# Patient Record
Sex: MALE | Race: WHITE | NOT HISPANIC OR LATINO
[De-identification: names, ages, dates, MRNs, and addresses within clinical notes are randomized per-mention and may not be internally consistent; named-entity substitution may affect disease eponyms.]

---

## 2018-03-29 ENCOUNTER — APPOINTMENT (OUTPATIENT)
Dept: SURGERY | Facility: CLINIC | Age: 63
End: 2018-03-29
Payer: COMMERCIAL

## 2018-03-29 VITALS
HEART RATE: 73 BPM | RESPIRATION RATE: 15 BRPM | BODY MASS INDEX: 26.56 KG/M2 | DIASTOLIC BLOOD PRESSURE: 83 MMHG | SYSTOLIC BLOOD PRESSURE: 136 MMHG | OXYGEN SATURATION: 100 % | TEMPERATURE: 98 F | WEIGHT: 207 LBS | HEIGHT: 74 IN

## 2018-03-29 DIAGNOSIS — Z83.3 FAMILY HISTORY OF DIABETES MELLITUS: ICD-10-CM

## 2018-03-29 DIAGNOSIS — K58.9 IRRITABLE BOWEL SYNDROME W/OUT DIARRHEA: ICD-10-CM

## 2018-03-29 DIAGNOSIS — H60.391 OTHER INFECTIVE OTITIS EXTERNA, RIGHT EAR: ICD-10-CM

## 2018-03-29 DIAGNOSIS — H61.21 IMPACTED CERUMEN, RIGHT EAR: ICD-10-CM

## 2018-03-29 PROCEDURE — 99406 BEHAV CHNG SMOKING 3-10 MIN: CPT

## 2018-03-29 PROCEDURE — 99203 OFFICE O/P NEW LOW 30 MIN: CPT | Mod: 25

## 2018-04-17 ENCOUNTER — OUTPATIENT (OUTPATIENT)
Dept: OUTPATIENT SERVICES | Facility: HOSPITAL | Age: 63
LOS: 1 days | End: 2018-04-17
Payer: COMMERCIAL

## 2018-04-17 ENCOUNTER — FORM ENCOUNTER (OUTPATIENT)
Age: 63
End: 2018-04-17

## 2018-04-17 VITALS
DIASTOLIC BLOOD PRESSURE: 77 MMHG | WEIGHT: 207.9 LBS | TEMPERATURE: 98 F | SYSTOLIC BLOOD PRESSURE: 128 MMHG | HEIGHT: 73 IN | OXYGEN SATURATION: 100 % | HEART RATE: 71 BPM | RESPIRATION RATE: 20 BRPM

## 2018-04-17 DIAGNOSIS — Z01.818 ENCOUNTER FOR OTHER PREPROCEDURAL EXAMINATION: ICD-10-CM

## 2018-04-17 DIAGNOSIS — Z98.890 OTHER SPECIFIED POSTPROCEDURAL STATES: Chronic | ICD-10-CM

## 2018-04-17 DIAGNOSIS — I73.9 PERIPHERAL VASCULAR DISEASE, UNSPECIFIED: ICD-10-CM

## 2018-04-17 DIAGNOSIS — K57.92 DIVERTICULITIS OF INTESTINE, PART UNSPECIFIED, WITHOUT PERFORATION OR ABSCESS WITHOUT BLEEDING: ICD-10-CM

## 2018-04-17 DIAGNOSIS — K57.32 DIVERTICULITIS OF LARGE INTESTINE WITHOUT PERFORATION OR ABSCESS WITHOUT BLEEDING: ICD-10-CM

## 2018-04-17 DIAGNOSIS — I73.9 PERIPHERAL VASCULAR DISEASE, UNSPECIFIED: Chronic | ICD-10-CM

## 2018-04-17 LAB
ANION GAP SERPL CALC-SCNC: 13 MMOL/L — SIGNIFICANT CHANGE UP (ref 5–17)
BLD GP AB SCN SERPL QL: NEGATIVE — SIGNIFICANT CHANGE UP
BUN SERPL-MCNC: 19 MG/DL — SIGNIFICANT CHANGE UP (ref 7–23)
CALCIUM SERPL-MCNC: 9.4 MG/DL — SIGNIFICANT CHANGE UP (ref 8.4–10.5)
CHLORIDE SERPL-SCNC: 101 MMOL/L — SIGNIFICANT CHANGE UP (ref 96–108)
CO2 SERPL-SCNC: 25 MMOL/L — SIGNIFICANT CHANGE UP (ref 22–31)
CREAT SERPL-MCNC: 1.08 MG/DL — SIGNIFICANT CHANGE UP (ref 0.5–1.3)
GLUCOSE SERPL-MCNC: 86 MG/DL — SIGNIFICANT CHANGE UP (ref 70–99)
HBA1C BLD-MCNC: 6.5 % — HIGH (ref 4–5.6)
HCT VFR BLD CALC: 38.9 % — LOW (ref 39–50)
HGB BLD-MCNC: 13.1 G/DL — SIGNIFICANT CHANGE UP (ref 13–17)
MCHC RBC-ENTMCNC: 28.4 PG — SIGNIFICANT CHANGE UP (ref 27–34)
MCHC RBC-ENTMCNC: 33.7 GM/DL — SIGNIFICANT CHANGE UP (ref 32–36)
MCV RBC AUTO: 84.4 FL — SIGNIFICANT CHANGE UP (ref 80–100)
PLATELET # BLD AUTO: 271 K/UL — SIGNIFICANT CHANGE UP (ref 150–400)
POTASSIUM SERPL-MCNC: 4.4 MMOL/L — SIGNIFICANT CHANGE UP (ref 3.5–5.3)
POTASSIUM SERPL-SCNC: 4.4 MMOL/L — SIGNIFICANT CHANGE UP (ref 3.5–5.3)
RBC # BLD: 4.61 M/UL — SIGNIFICANT CHANGE UP (ref 4.2–5.8)
RBC # FLD: 16.5 % — HIGH (ref 10.3–14.5)
RH IG SCN BLD-IMP: POSITIVE — SIGNIFICANT CHANGE UP
SODIUM SERPL-SCNC: 139 MMOL/L — SIGNIFICANT CHANGE UP (ref 135–145)
WBC # BLD: 9.44 K/UL — SIGNIFICANT CHANGE UP (ref 3.8–10.5)
WBC # FLD AUTO: 9.44 K/UL — SIGNIFICANT CHANGE UP (ref 3.8–10.5)

## 2018-04-17 PROCEDURE — 86900 BLOOD TYPING SEROLOGIC ABO: CPT

## 2018-04-17 PROCEDURE — 80048 BASIC METABOLIC PNL TOTAL CA: CPT

## 2018-04-17 PROCEDURE — 86850 RBC ANTIBODY SCREEN: CPT

## 2018-04-17 PROCEDURE — G0463: CPT

## 2018-04-17 PROCEDURE — 83036 HEMOGLOBIN GLYCOSYLATED A1C: CPT

## 2018-04-17 PROCEDURE — 85027 COMPLETE CBC AUTOMATED: CPT

## 2018-04-17 PROCEDURE — 87086 URINE CULTURE/COLONY COUNT: CPT

## 2018-04-17 PROCEDURE — 86901 BLOOD TYPING SEROLOGIC RH(D): CPT

## 2018-04-17 RX ORDER — SODIUM CHLORIDE 9 MG/ML
3 INJECTION INTRAMUSCULAR; INTRAVENOUS; SUBCUTANEOUS EVERY 8 HOURS
Qty: 0 | Refills: 0 | Status: DISCONTINUED | OUTPATIENT
Start: 2018-04-27 | End: 2018-04-27

## 2018-04-17 RX ORDER — CEFOTETAN DISODIUM 1 G
2 VIAL (EA) INJECTION ONCE
Qty: 0 | Refills: 0 | Status: COMPLETED | OUTPATIENT
Start: 2018-04-27 | End: 2018-04-27

## 2018-04-17 RX ORDER — GABAPENTIN 400 MG/1
400 CAPSULE ORAL ONCE
Qty: 0 | Refills: 0 | Status: DISCONTINUED | OUTPATIENT
Start: 2018-04-27 | End: 2018-04-27

## 2018-04-17 RX ORDER — LIDOCAINE HCL 20 MG/ML
0.2 VIAL (ML) INJECTION ONCE
Qty: 0 | Refills: 0 | Status: DISCONTINUED | OUTPATIENT
Start: 2018-04-27 | End: 2018-04-27

## 2018-04-17 NOTE — H&P PST ADULT - PSH
H/O hernia repair    PAD (peripheral artery disease)  s/p left lower leg artery stent  S/P Nissen fundoplication (without gastrostomy tube) procedure H/O hernia repair  right inguinal  PAD (peripheral artery disease)  s/p left calf  artery stent  S/P Nissen fundoplication (without gastrostomy tube) procedure

## 2018-04-17 NOTE — H&P PST ADULT - RS GEN PE MLT RESP DETAILS PC
normal/airway patent/good air movement/clear to auscultation bilaterally/breath sounds equal/respirations non-labored

## 2018-04-17 NOTE — H&P PST ADULT - PMH
Anxiety    Borderline diabetes mellitus    BPH (benign prostatic hyperplasia)    Depression    Diverticulitis    Hyperlipidemia    PAD (peripheral artery disease)    Restless leg syndrome Anxiety    Borderline diabetes mellitus    BPH (benign prostatic hyperplasia)    Depression    Diverticulitis    History of hiatal hernia    Hyperlipidemia    PAD (peripheral artery disease)    Restless leg syndrome Anxiety    Borderline diabetes mellitus  diet controlled  BPH (benign prostatic hyperplasia)    Depression    Diverticulitis  last attack in March 2018- treated with antibiotics  History of hiatal hernia  s/p surgery  Hyperlipidemia    PAD (peripheral artery disease)    Restless leg syndrome    Smoker

## 2018-04-17 NOTE — H&P PST ADULT - NSANTHOSAYNRD_GEN_A_CORE
No. ROLLY screening performed.  STOP BANG Legend: 0-2 = LOW Risk; 3-4 = INTERMEDIATE Risk; 5-8 = HIGH Risk

## 2018-04-17 NOTE — H&P PST ADULT - PROBLEM SELECTOR PLAN 1
ERP  Laparoscopic anterior resection  Possible ostomy, Possible open, Cystoscopy insertion of ureteral catheters on 4/27/18.

## 2018-04-17 NOTE — H&P PST ADULT - HISTORY OF PRESENT ILLNESS
62 yr old male with HTN, Hyperlipidemia, PAD - left calf stent , restless leg syndrome , Anxiety, Diverticulitis- last one March 2018- Treated 62 yr old male with HTN, Hyperlipidemia, PAD - left calf stent , restless leg syndrome , Anxiety & Depression,  Diverticulitis- last one March 2018- Treated with antibiotics, Now coming in for 62 yr old male with HTN, Hyperlipidemia, PAD - left calf stent , restless leg syndrome , Anxiety & Depression,  Diverticulitis- last one March 2018- Treated with antibiotics, Now coming in for ERP, Laparoscopic anterior resection, Possible ostomy, Possible open, Cystoscopy insertion of ureteral catheters on 4/27/18. 62 yr old male with HTN, Hyperlipidemia, PAD - left calf stent , restless leg syndrome , Anxiety & Depression,  Diverticulitis multiple episodes with persistent extraluminal collection and sigmoid sigmoid fistula- last one March 2018- Treated with antibiotics, Now coming in for ERP, Laparoscopic anterior resection, Possible ostomy, Possible open, Cystoscopy insertion of ureteral catheters on 4/27/18.

## 2018-04-18 ENCOUNTER — APPOINTMENT (OUTPATIENT)
Dept: CT IMAGING | Facility: CLINIC | Age: 63
End: 2018-04-18
Payer: COMMERCIAL

## 2018-04-18 ENCOUNTER — OUTPATIENT (OUTPATIENT)
Dept: OUTPATIENT SERVICES | Facility: HOSPITAL | Age: 63
LOS: 1 days | End: 2018-04-18
Payer: COMMERCIAL

## 2018-04-18 DIAGNOSIS — Z98.890 OTHER SPECIFIED POSTPROCEDURAL STATES: Chronic | ICD-10-CM

## 2018-04-18 DIAGNOSIS — I73.9 PERIPHERAL VASCULAR DISEASE, UNSPECIFIED: Chronic | ICD-10-CM

## 2018-04-18 DIAGNOSIS — Z00.8 ENCOUNTER FOR OTHER GENERAL EXAMINATION: ICD-10-CM

## 2018-04-18 LAB
CULTURE RESULTS: NO GROWTH — SIGNIFICANT CHANGE UP
SPECIMEN SOURCE: SIGNIFICANT CHANGE UP

## 2018-04-18 PROCEDURE — 74177 CT ABD & PELVIS W/CONTRAST: CPT | Mod: 26

## 2018-04-18 PROCEDURE — 74177 CT ABD & PELVIS W/CONTRAST: CPT

## 2018-04-19 ENCOUNTER — APPOINTMENT (OUTPATIENT)
Dept: SURGERY | Facility: CLINIC | Age: 63
End: 2018-04-19
Payer: COMMERCIAL

## 2018-04-19 VITALS
DIASTOLIC BLOOD PRESSURE: 72 MMHG | TEMPERATURE: 98.1 F | HEART RATE: 87 BPM | SYSTOLIC BLOOD PRESSURE: 146 MMHG | OXYGEN SATURATION: 98 % | RESPIRATION RATE: 16 BRPM

## 2018-04-19 PROCEDURE — 99214 OFFICE O/P EST MOD 30 MIN: CPT

## 2018-04-19 RX ORDER — ATORVASTATIN CALCIUM 40 MG/1
40 TABLET, FILM COATED ORAL
Qty: 30 | Refills: 0 | Status: DISCONTINUED | COMMUNITY
Start: 2017-11-17

## 2018-04-19 RX ORDER — CIPROFLOXACIN HYDROCHLORIDE 500 MG/1
500 TABLET, FILM COATED ORAL
Refills: 0 | Status: DISCONTINUED | COMMUNITY
End: 2018-04-19

## 2018-04-19 RX ORDER — ALPRAZOLAM 2 MG/1
TABLET ORAL
Refills: 0 | Status: ACTIVE | COMMUNITY

## 2018-04-19 RX ORDER — ROSUVASTATIN CALCIUM 20 MG/1
20 TABLET, FILM COATED ORAL
Qty: 30 | Refills: 0 | Status: DISCONTINUED | COMMUNITY
Start: 2017-12-20

## 2018-04-19 RX ORDER — METRONIDAZOLE 500 MG/1
500 TABLET, FILM COATED ORAL
Refills: 0 | Status: DISCONTINUED | COMMUNITY
End: 2018-04-19

## 2018-04-25 ENCOUNTER — APPOINTMENT (OUTPATIENT)
Dept: UROLOGY | Facility: CLINIC | Age: 63
End: 2018-04-25

## 2018-04-26 ENCOUNTER — TRANSCRIPTION ENCOUNTER (OUTPATIENT)
Age: 63
End: 2018-04-26

## 2018-04-27 ENCOUNTER — INPATIENT (INPATIENT)
Facility: HOSPITAL | Age: 63
LOS: 1 days | Discharge: HOME | DRG: 330 | End: 2018-04-29
Payer: COMMERCIAL

## 2018-04-27 ENCOUNTER — APPOINTMENT (OUTPATIENT)
Dept: SURGERY | Facility: HOSPITAL | Age: 63
End: 2018-04-27
Payer: COMMERCIAL

## 2018-04-27 ENCOUNTER — RESULT REVIEW (OUTPATIENT)
Age: 63
End: 2018-04-27

## 2018-04-27 VITALS
HEART RATE: 77 BPM | RESPIRATION RATE: 18 BRPM | HEIGHT: 73 IN | DIASTOLIC BLOOD PRESSURE: 57 MMHG | WEIGHT: 207.9 LBS | OXYGEN SATURATION: 95 % | TEMPERATURE: 98 F | SYSTOLIC BLOOD PRESSURE: 93 MMHG

## 2018-04-27 DIAGNOSIS — K57.32 DIVERTICULITIS OF LARGE INTESTINE WITHOUT PERFORATION OR ABSCESS WITHOUT BLEEDING: ICD-10-CM

## 2018-04-27 DIAGNOSIS — I73.9 PERIPHERAL VASCULAR DISEASE, UNSPECIFIED: Chronic | ICD-10-CM

## 2018-04-27 DIAGNOSIS — Z98.890 OTHER SPECIFIED POSTPROCEDURAL STATES: Chronic | ICD-10-CM

## 2018-04-27 LAB
ANION GAP SERPL CALC-SCNC: 17 MMOL/L — SIGNIFICANT CHANGE UP (ref 5–17)
ANION GAP SERPL CALC-SCNC: 19 MMOL/L — HIGH (ref 5–17)
BUN SERPL-MCNC: 19 MG/DL — SIGNIFICANT CHANGE UP (ref 7–23)
BUN SERPL-MCNC: 20 MG/DL — SIGNIFICANT CHANGE UP (ref 7–23)
CALCIUM SERPL-MCNC: 8.4 MG/DL — SIGNIFICANT CHANGE UP (ref 8.4–10.5)
CALCIUM SERPL-MCNC: 8.5 MG/DL — SIGNIFICANT CHANGE UP (ref 8.4–10.5)
CHLORIDE SERPL-SCNC: 94 MMOL/L — LOW (ref 96–108)
CHLORIDE SERPL-SCNC: 96 MMOL/L — SIGNIFICANT CHANGE UP (ref 96–108)
CO2 SERPL-SCNC: 21 MMOL/L — LOW (ref 22–31)
CO2 SERPL-SCNC: 24 MMOL/L — SIGNIFICANT CHANGE UP (ref 22–31)
CREAT SERPL-MCNC: 1.42 MG/DL — HIGH (ref 0.5–1.3)
CREAT SERPL-MCNC: 1.43 MG/DL — HIGH (ref 0.5–1.3)
GLUCOSE BLDC GLUCOMTR-MCNC: 96 MG/DL — SIGNIFICANT CHANGE UP (ref 70–99)
GLUCOSE SERPL-MCNC: 140 MG/DL — HIGH (ref 70–99)
GLUCOSE SERPL-MCNC: 171 MG/DL — HIGH (ref 70–99)
HCT VFR BLD CALC: 42.5 % — SIGNIFICANT CHANGE UP (ref 39–50)
HGB BLD-MCNC: 13.8 G/DL — SIGNIFICANT CHANGE UP (ref 13–17)
MAGNESIUM SERPL-MCNC: 1.8 MG/DL — SIGNIFICANT CHANGE UP (ref 1.6–2.6)
MCHC RBC-ENTMCNC: 28.8 PG — SIGNIFICANT CHANGE UP (ref 27–34)
MCHC RBC-ENTMCNC: 32.6 GM/DL — SIGNIFICANT CHANGE UP (ref 32–36)
MCV RBC AUTO: 88.4 FL — SIGNIFICANT CHANGE UP (ref 80–100)
PHOSPHATE SERPL-MCNC: 4.7 MG/DL — HIGH (ref 2.5–4.5)
PLATELET # BLD AUTO: 234 K/UL — SIGNIFICANT CHANGE UP (ref 150–400)
POTASSIUM SERPL-MCNC: 4.6 MMOL/L — SIGNIFICANT CHANGE UP (ref 3.5–5.3)
POTASSIUM SERPL-MCNC: 4.7 MMOL/L — SIGNIFICANT CHANGE UP (ref 3.5–5.3)
POTASSIUM SERPL-SCNC: 4.6 MMOL/L — SIGNIFICANT CHANGE UP (ref 3.5–5.3)
POTASSIUM SERPL-SCNC: 4.7 MMOL/L — SIGNIFICANT CHANGE UP (ref 3.5–5.3)
RBC # BLD: 4.8 M/UL — SIGNIFICANT CHANGE UP (ref 4.2–5.8)
RBC # FLD: 14 % — SIGNIFICANT CHANGE UP (ref 10.3–14.5)
RH IG SCN BLD-IMP: POSITIVE — SIGNIFICANT CHANGE UP
SODIUM SERPL-SCNC: 134 MMOL/L — LOW (ref 135–145)
SODIUM SERPL-SCNC: 137 MMOL/L — SIGNIFICANT CHANGE UP (ref 135–145)
WBC # BLD: 19.2 K/UL — HIGH (ref 3.8–10.5)
WBC # FLD AUTO: 19.2 K/UL — HIGH (ref 3.8–10.5)

## 2018-04-27 PROCEDURE — 44207 L COLECTOMY/COLOPROCTOSTOMY: CPT

## 2018-04-27 PROCEDURE — 52332 CYSTOSCOPY AND TREATMENT: CPT | Mod: 50

## 2018-04-27 PROCEDURE — 44955 APPENDECTOMY ADD-ON: CPT

## 2018-04-27 PROCEDURE — 49905 OMENTAL FLAP INTRA-ABDOM: CPT

## 2018-04-27 PROCEDURE — 88307 TISSUE EXAM BY PATHOLOGIST: CPT | Mod: 26

## 2018-04-27 PROCEDURE — 88304 TISSUE EXAM BY PATHOLOGIST: CPT | Mod: 26

## 2018-04-27 PROCEDURE — 44213 LAP MOBIL SPLENIC FL ADD-ON: CPT

## 2018-04-27 PROCEDURE — 49585: CPT | Mod: 59

## 2018-04-27 PROCEDURE — 88305 TISSUE EXAM BY PATHOLOGIST: CPT | Mod: 26

## 2018-04-27 RX ORDER — NICOTINE POLACRILEX 2 MG
1 GUM BUCCAL DAILY
Qty: 0 | Refills: 0 | Status: DISCONTINUED | OUTPATIENT
Start: 2018-04-27 | End: 2018-04-29

## 2018-04-27 RX ORDER — DIPHENHYDRAMINE HCL 50 MG
25 CAPSULE ORAL ONCE
Qty: 0 | Refills: 0 | Status: COMPLETED | OUTPATIENT
Start: 2018-04-27 | End: 2018-04-27

## 2018-04-27 RX ORDER — OXYCODONE HYDROCHLORIDE 5 MG/1
5 TABLET ORAL EVERY 6 HOURS
Qty: 0 | Refills: 0 | Status: DISCONTINUED | OUTPATIENT
Start: 2018-04-27 | End: 2018-04-28

## 2018-04-27 RX ORDER — ALPRAZOLAM 0.25 MG
0.5 TABLET ORAL ONCE
Qty: 0 | Refills: 0 | Status: DISCONTINUED | OUTPATIENT
Start: 2018-04-27 | End: 2018-04-27

## 2018-04-27 RX ORDER — HEPARIN SODIUM 5000 [USP'U]/ML
5000 INJECTION INTRAVENOUS; SUBCUTANEOUS ONCE
Qty: 0 | Refills: 0 | Status: COMPLETED | OUTPATIENT
Start: 2018-04-27 | End: 2018-04-27

## 2018-04-27 RX ORDER — GABAPENTIN 400 MG/1
600 CAPSULE ORAL ONCE
Qty: 0 | Refills: 0 | Status: COMPLETED | OUTPATIENT
Start: 2018-04-27 | End: 2018-04-27

## 2018-04-27 RX ORDER — CELECOXIB 200 MG/1
400 CAPSULE ORAL ONCE
Qty: 0 | Refills: 0 | Status: COMPLETED | OUTPATIENT
Start: 2018-04-27 | End: 2018-04-27

## 2018-04-27 RX ORDER — ACETAMINOPHEN 500 MG
1000 TABLET ORAL ONCE
Qty: 0 | Refills: 0 | Status: DISCONTINUED | OUTPATIENT
Start: 2018-04-28 | End: 2018-04-28

## 2018-04-27 RX ORDER — OXYCODONE HYDROCHLORIDE 5 MG/1
10 TABLET ORAL EVERY 6 HOURS
Qty: 0 | Refills: 0 | Status: DISCONTINUED | OUTPATIENT
Start: 2018-04-27 | End: 2018-04-28

## 2018-04-27 RX ORDER — ACETAMINOPHEN 500 MG
1000 TABLET ORAL ONCE
Qty: 0 | Refills: 0 | Status: COMPLETED | OUTPATIENT
Start: 2018-04-27 | End: 2018-04-27

## 2018-04-27 RX ORDER — KETOROLAC TROMETHAMINE 30 MG/ML
30 SYRINGE (ML) INJECTION EVERY 6 HOURS
Qty: 0 | Refills: 0 | Status: DISCONTINUED | OUTPATIENT
Start: 2018-04-27 | End: 2018-04-27

## 2018-04-27 RX ORDER — CEFOTETAN DISODIUM 1 G
2 VIAL (EA) INJECTION ONCE
Qty: 0 | Refills: 0 | Status: COMPLETED | OUTPATIENT
Start: 2018-04-28 | End: 2018-04-28

## 2018-04-27 RX ORDER — HEPARIN SODIUM 5000 [USP'U]/ML
5000 INJECTION INTRAVENOUS; SUBCUTANEOUS EVERY 8 HOURS
Qty: 0 | Refills: 0 | Status: DISCONTINUED | OUTPATIENT
Start: 2018-04-27 | End: 2018-04-29

## 2018-04-27 RX ORDER — ACETAMINOPHEN 500 MG
1000 TABLET ORAL ONCE
Qty: 0 | Refills: 0 | Status: COMPLETED | OUTPATIENT
Start: 2018-04-28 | End: 2018-04-28

## 2018-04-27 RX ORDER — ONDANSETRON 8 MG/1
4 TABLET, FILM COATED ORAL ONCE
Qty: 0 | Refills: 0 | Status: DISCONTINUED | OUTPATIENT
Start: 2018-04-27 | End: 2018-04-27

## 2018-04-27 RX ORDER — ENOXAPARIN SODIUM 100 MG/ML
40 INJECTION SUBCUTANEOUS DAILY
Qty: 0 | Refills: 0 | Status: DISCONTINUED | OUTPATIENT
Start: 2018-04-27 | End: 2018-04-27

## 2018-04-27 RX ORDER — TRAZODONE HCL 50 MG
100 TABLET ORAL AT BEDTIME
Qty: 0 | Refills: 0 | Status: DISCONTINUED | OUTPATIENT
Start: 2018-04-27 | End: 2018-04-29

## 2018-04-27 RX ORDER — SODIUM CHLORIDE 9 MG/ML
1000 INJECTION, SOLUTION INTRAVENOUS
Qty: 0 | Refills: 0 | Status: DISCONTINUED | OUTPATIENT
Start: 2018-04-27 | End: 2018-04-28

## 2018-04-27 RX ADMIN — HEPARIN SODIUM 5000 UNIT(S): 5000 INJECTION INTRAVENOUS; SUBCUTANEOUS at 22:16

## 2018-04-27 RX ADMIN — Medication 1000 MILLIGRAM(S): at 19:00

## 2018-04-27 RX ADMIN — SODIUM CHLORIDE 40 MILLILITER(S): 9 INJECTION, SOLUTION INTRAVENOUS at 15:30

## 2018-04-27 RX ADMIN — Medication 25 MILLIGRAM(S): at 15:10

## 2018-04-27 RX ADMIN — Medication 0.5 MILLIGRAM(S): at 22:14

## 2018-04-27 RX ADMIN — Medication 400 MILLIGRAM(S): at 18:45

## 2018-04-27 RX ADMIN — HEPARIN SODIUM 5000 UNIT(S): 5000 INJECTION INTRAVENOUS; SUBCUTANEOUS at 06:40

## 2018-04-27 RX ADMIN — CELECOXIB 400 MILLIGRAM(S): 200 CAPSULE ORAL at 06:40

## 2018-04-27 RX ADMIN — OXYCODONE HYDROCHLORIDE 5 MILLIGRAM(S): 5 TABLET ORAL at 23:12

## 2018-04-27 RX ADMIN — GABAPENTIN 600 MILLIGRAM(S): 400 CAPSULE ORAL at 07:16

## 2018-04-27 RX ADMIN — OXYCODONE HYDROCHLORIDE 5 MILLIGRAM(S): 5 TABLET ORAL at 23:40

## 2018-04-27 NOTE — BRIEF OPERATIVE NOTE - PROCEDURE
<<-----Click on this checkbox to enter Procedure Low anterior resection of colon with end-to-end anastomosis  04/27/2018    Active  [id number]

## 2018-04-27 NOTE — CHART NOTE - NSCHARTNOTEFT_GEN_A_CORE
Surgery Post-Op Note    Pre-Op Dx: diverticulitis   Procedure: Low anterior resection of colon with end-to-end anastomosis    Surgeon: Dr. Grover    Subjective:   Pt seen and examined at the bedside. Pt w/ no complaints. Denies F/C/N/V. Pain controlled with medication.  Has been OOB to chair    Vital Signs Last 24 Hrs  T(C): 36.2 (27 Apr 2018 19:00), Max: 36.6 (27 Apr 2018 06:44)  T(F): 97.2 (27 Apr 2018 19:00), Max: 97.9 (27 Apr 2018 06:44)  HR: 72 (27 Apr 2018 19:45) (68 - 82)  BP: 99/54 (27 Apr 2018 19:45) (80/50 - 119/68)  BP(mean): 72 (27 Apr 2018 19:45) (58 - 88)  RR: 15 (27 Apr 2018 19:45) (12 - 18)  SpO2: 99% (27 Apr 2018 19:45) (93% - 100%)    Physical Exam:  General: NAD, resting comfortably in bed  Neuro: A/O x 3, no focal deficits  Pulmonary: Nonlabored breathing, no respiratory distress  Cardiovascular: NSR  Abdominal: soft, ATTP. ND  Incision: C/D/I   Extremities: WWP  : lewis and u cath in place draining fruit punch colored urine        LABS:                        13.8   19.2  )-----------( 234      ( 27 Apr 2018 15:10 )             42.5     04-27    137  |  94<L>  |  20  ----------------------------<  140<H>  4.7   |  24  |  1.43<H>    Ca    8.5      27 Apr 2018 17:05  Phos  4.7     04-27  Mg     1.8     04-27        CAPILLARY BLOOD GLUCOSE      POCT Blood Glucose.: 96 mg/dL (27 Apr 2018 06:32)        ABO Interpretation: B (04-27 @ 06:53)        Radiology and Additional Studies:    Assessment:62y Male s/p above procedure    Plan:  IVF, Diet: clears  keep lewis in place, d/c ucath tomorrow  monitor UOP  elevated BUN/cr post op, continue to monitor   Pain/nausea control PRN  Incentive spirometer/OOB/Ambulate

## 2018-04-27 NOTE — PATIENT PROFILE ADULT. - PMH
Anxiety    Borderline diabetes mellitus  diet controlled  BPH (benign prostatic hyperplasia)    Depression    Diverticulitis  last attack in March 2018- treated with antibiotics  History of hiatal hernia  s/p surgery  Hyperlipidemia    PAD (peripheral artery disease)    Restless leg syndrome    Smoker

## 2018-04-27 NOTE — PATIENT PROFILE ADULT. - PSH
H/O hernia repair  right inguinal  PAD (peripheral artery disease)  s/p left calf  artery stent  S/P Nissen fundoplication (without gastrostomy tube) procedure

## 2018-04-27 NOTE — BRIEF OPERATIVE NOTE - OPERATION/FINDINGS
Laparoscopic low anterior resection. Descending colon and splenic flexure mobilized. Large phlegmon noted in pelvis, dissected free. Colon exteriorized, palpable mesenteric vessels seen to area of proximal transection. Colon returned to abdomen and size 33 EEA used to create end to end stapled anastomosis. Negative leak test.

## 2018-04-28 ENCOUNTER — TRANSCRIPTION ENCOUNTER (OUTPATIENT)
Age: 63
End: 2018-04-28

## 2018-04-28 LAB
ANION GAP SERPL CALC-SCNC: 9 MMOL/L — SIGNIFICANT CHANGE UP (ref 5–17)
BUN SERPL-MCNC: 19 MG/DL — SIGNIFICANT CHANGE UP (ref 7–23)
CALCIUM SERPL-MCNC: 8.3 MG/DL — LOW (ref 8.4–10.5)
CHLORIDE SERPL-SCNC: 98 MMOL/L — SIGNIFICANT CHANGE UP (ref 96–108)
CO2 SERPL-SCNC: 28 MMOL/L — SIGNIFICANT CHANGE UP (ref 22–31)
CREAT SERPL-MCNC: 1.16 MG/DL — SIGNIFICANT CHANGE UP (ref 0.5–1.3)
GLUCOSE SERPL-MCNC: 116 MG/DL — HIGH (ref 70–99)
HCT VFR BLD CALC: 33.6 % — LOW (ref 39–50)
HCT VFR BLD CALC: 33.8 % — LOW (ref 39–50)
HGB BLD-MCNC: 10.8 G/DL — LOW (ref 13–17)
HGB BLD-MCNC: 11.2 G/DL — LOW (ref 13–17)
MAGNESIUM SERPL-MCNC: 2 MG/DL — SIGNIFICANT CHANGE UP (ref 1.6–2.6)
MCHC RBC-ENTMCNC: 27.6 PG — SIGNIFICANT CHANGE UP (ref 27–34)
MCHC RBC-ENTMCNC: 29.2 PG — SIGNIFICANT CHANGE UP (ref 27–34)
MCHC RBC-ENTMCNC: 32.1 GM/DL — SIGNIFICANT CHANGE UP (ref 32–36)
MCHC RBC-ENTMCNC: 33.2 GM/DL — SIGNIFICANT CHANGE UP (ref 32–36)
MCV RBC AUTO: 85.7 FL — SIGNIFICANT CHANGE UP (ref 80–100)
MCV RBC AUTO: 88 FL — SIGNIFICANT CHANGE UP (ref 80–100)
PHOSPHATE SERPL-MCNC: 3.2 MG/DL — SIGNIFICANT CHANGE UP (ref 2.5–4.5)
PLATELET # BLD AUTO: 189 K/UL — SIGNIFICANT CHANGE UP (ref 150–400)
PLATELET # BLD AUTO: 203 K/UL — SIGNIFICANT CHANGE UP (ref 150–400)
POTASSIUM SERPL-MCNC: 4 MMOL/L — SIGNIFICANT CHANGE UP (ref 3.5–5.3)
POTASSIUM SERPL-SCNC: 4 MMOL/L — SIGNIFICANT CHANGE UP (ref 3.5–5.3)
RBC # BLD: 3.84 M/UL — LOW (ref 4.2–5.8)
RBC # BLD: 3.92 M/UL — LOW (ref 4.2–5.8)
RBC # FLD: 13.8 % — SIGNIFICANT CHANGE UP (ref 10.3–14.5)
RBC # FLD: 16 % — HIGH (ref 10.3–14.5)
SODIUM SERPL-SCNC: 135 MMOL/L — SIGNIFICANT CHANGE UP (ref 135–145)
WBC # BLD: 11.3 K/UL — HIGH (ref 3.8–10.5)
WBC # BLD: 12.48 K/UL — HIGH (ref 3.8–10.5)
WBC # FLD AUTO: 11.3 K/UL — HIGH (ref 3.8–10.5)
WBC # FLD AUTO: 12.48 K/UL — HIGH (ref 3.8–10.5)

## 2018-04-28 PROCEDURE — 74018 RADEX ABDOMEN 1 VIEW: CPT | Mod: 26

## 2018-04-28 RX ORDER — IBUPROFEN 200 MG
600 TABLET ORAL EVERY 6 HOURS
Qty: 0 | Refills: 0 | Status: DISCONTINUED | OUTPATIENT
Start: 2018-04-28 | End: 2018-04-29

## 2018-04-28 RX ORDER — ALPRAZOLAM 0.25 MG
0.5 TABLET ORAL THREE TIMES A DAY
Qty: 0 | Refills: 0 | Status: DISCONTINUED | OUTPATIENT
Start: 2018-04-28 | End: 2018-04-29

## 2018-04-28 RX ORDER — ROPINIROLE 8 MG/1
1 TABLET, FILM COATED, EXTENDED RELEASE ORAL THREE TIMES A DAY
Qty: 0 | Refills: 0 | Status: DISCONTINUED | OUTPATIENT
Start: 2018-04-28 | End: 2018-04-29

## 2018-04-28 RX ORDER — SIMETHICONE 80 MG/1
80 TABLET, CHEWABLE ORAL
Qty: 0 | Refills: 0 | Status: DISCONTINUED | OUTPATIENT
Start: 2018-04-28 | End: 2018-04-29

## 2018-04-28 RX ORDER — SIMETHICONE 80 MG/1
1 TABLET, CHEWABLE ORAL
Qty: 0 | Refills: 0 | COMMUNITY
Start: 2018-04-28

## 2018-04-28 RX ORDER — OXYCODONE HYDROCHLORIDE 5 MG/1
10 TABLET ORAL EVERY 4 HOURS
Qty: 0 | Refills: 0 | Status: DISCONTINUED | OUTPATIENT
Start: 2018-04-28 | End: 2018-04-29

## 2018-04-28 RX ORDER — ACETAMINOPHEN 500 MG
975 TABLET ORAL EVERY 6 HOURS
Qty: 0 | Refills: 0 | Status: DISCONTINUED | OUTPATIENT
Start: 2018-04-28 | End: 2018-04-29

## 2018-04-28 RX ORDER — SERTRALINE 25 MG/1
100 TABLET, FILM COATED ORAL DAILY
Qty: 0 | Refills: 0 | Status: DISCONTINUED | OUTPATIENT
Start: 2018-04-28 | End: 2018-04-29

## 2018-04-28 RX ORDER — TAMSULOSIN HYDROCHLORIDE 0.4 MG/1
0.4 CAPSULE ORAL ONCE
Qty: 0 | Refills: 0 | Status: COMPLETED | OUTPATIENT
Start: 2018-04-28 | End: 2018-04-28

## 2018-04-28 RX ORDER — ATORVASTATIN CALCIUM 80 MG/1
80 TABLET, FILM COATED ORAL AT BEDTIME
Qty: 0 | Refills: 0 | Status: DISCONTINUED | OUTPATIENT
Start: 2018-04-28 | End: 2018-04-29

## 2018-04-28 RX ORDER — FAMOTIDINE 10 MG/ML
20 INJECTION INTRAVENOUS ONCE
Qty: 0 | Refills: 0 | Status: COMPLETED | OUTPATIENT
Start: 2018-04-28 | End: 2018-04-28

## 2018-04-28 RX ORDER — SERTRALINE 25 MG/1
200 TABLET, FILM COATED ORAL DAILY
Qty: 0 | Refills: 0 | Status: DISCONTINUED | OUTPATIENT
Start: 2018-04-28 | End: 2018-04-28

## 2018-04-28 RX ORDER — IBUPROFEN 200 MG
1 TABLET ORAL
Qty: 0 | Refills: 0 | COMMUNITY
Start: 2018-04-28

## 2018-04-28 RX ORDER — OXYCODONE HYDROCHLORIDE 5 MG/1
5 TABLET ORAL EVERY 4 HOURS
Qty: 0 | Refills: 0 | Status: DISCONTINUED | OUTPATIENT
Start: 2018-04-28 | End: 2018-04-29

## 2018-04-28 RX ORDER — ACETAMINOPHEN 500 MG
2 TABLET ORAL
Qty: 0 | Refills: 0 | COMMUNITY
Start: 2018-04-28

## 2018-04-28 RX ORDER — MAGNESIUM OXIDE 400 MG ORAL TABLET 241.3 MG
1000 TABLET ORAL
Qty: 0 | Refills: 0 | Status: DISCONTINUED | OUTPATIENT
Start: 2018-04-28 | End: 2018-04-29

## 2018-04-28 RX ORDER — TAMSULOSIN HYDROCHLORIDE 0.4 MG/1
1 CAPSULE ORAL
Qty: 7 | Refills: 0 | OUTPATIENT
Start: 2018-04-28 | End: 2018-05-04

## 2018-04-28 RX ORDER — TAMSULOSIN HYDROCHLORIDE 0.4 MG/1
0.4 CAPSULE ORAL DAILY
Qty: 0 | Refills: 0 | Status: DISCONTINUED | OUTPATIENT
Start: 2018-04-28 | End: 2018-04-29

## 2018-04-28 RX ADMIN — SIMETHICONE 80 MILLIGRAM(S): 80 TABLET, CHEWABLE ORAL at 12:12

## 2018-04-28 RX ADMIN — HEPARIN SODIUM 5000 UNIT(S): 5000 INJECTION INTRAVENOUS; SUBCUTANEOUS at 13:29

## 2018-04-28 RX ADMIN — SERTRALINE 100 MILLIGRAM(S): 25 TABLET, FILM COATED ORAL at 12:12

## 2018-04-28 RX ADMIN — Medication 1 PATCH: at 12:15

## 2018-04-28 RX ADMIN — MAGNESIUM OXIDE 400 MG ORAL TABLET 1000 MILLIGRAM(S): 241.3 TABLET ORAL at 17:21

## 2018-04-28 RX ADMIN — ROPINIROLE 1 MILLIGRAM(S): 8 TABLET, FILM COATED, EXTENDED RELEASE ORAL at 12:13

## 2018-04-28 RX ADMIN — ROPINIROLE 1 MILLIGRAM(S): 8 TABLET, FILM COATED, EXTENDED RELEASE ORAL at 21:25

## 2018-04-28 RX ADMIN — Medication 600 MILLIGRAM(S): at 12:12

## 2018-04-28 RX ADMIN — Medication 400 MILLIGRAM(S): at 00:12

## 2018-04-28 RX ADMIN — Medication 975 MILLIGRAM(S): at 21:55

## 2018-04-28 RX ADMIN — Medication 600 MILLIGRAM(S): at 17:45

## 2018-04-28 RX ADMIN — OXYCODONE HYDROCHLORIDE 10 MILLIGRAM(S): 5 TABLET ORAL at 02:23

## 2018-04-28 RX ADMIN — Medication 600 MILLIGRAM(S): at 23:15

## 2018-04-28 RX ADMIN — HEPARIN SODIUM 5000 UNIT(S): 5000 INJECTION INTRAVENOUS; SUBCUTANEOUS at 21:27

## 2018-04-28 RX ADMIN — Medication 975 MILLIGRAM(S): at 13:59

## 2018-04-28 RX ADMIN — SIMETHICONE 80 MILLIGRAM(S): 80 TABLET, CHEWABLE ORAL at 17:15

## 2018-04-28 RX ADMIN — Medication 975 MILLIGRAM(S): at 08:23

## 2018-04-28 RX ADMIN — FAMOTIDINE 20 MILLIGRAM(S): 10 INJECTION INTRAVENOUS at 21:56

## 2018-04-28 RX ADMIN — Medication 975 MILLIGRAM(S): at 08:53

## 2018-04-28 RX ADMIN — MAGNESIUM OXIDE 400 MG ORAL TABLET 1000 MILLIGRAM(S): 241.3 TABLET ORAL at 08:22

## 2018-04-28 RX ADMIN — OXYCODONE HYDROCHLORIDE 5 MILLIGRAM(S): 5 TABLET ORAL at 12:42

## 2018-04-28 RX ADMIN — Medication 600 MILLIGRAM(S): at 17:15

## 2018-04-28 RX ADMIN — OXYCODONE HYDROCHLORIDE 10 MILLIGRAM(S): 5 TABLET ORAL at 17:45

## 2018-04-28 RX ADMIN — OXYCODONE HYDROCHLORIDE 10 MILLIGRAM(S): 5 TABLET ORAL at 21:55

## 2018-04-28 RX ADMIN — OXYCODONE HYDROCHLORIDE 10 MILLIGRAM(S): 5 TABLET ORAL at 02:47

## 2018-04-28 RX ADMIN — TAMSULOSIN HYDROCHLORIDE 0.4 MILLIGRAM(S): 0.4 CAPSULE ORAL at 10:20

## 2018-04-28 RX ADMIN — TAMSULOSIN HYDROCHLORIDE 0.4 MILLIGRAM(S): 0.4 CAPSULE ORAL at 21:25

## 2018-04-28 RX ADMIN — Medication 100 GRAM(S): at 00:18

## 2018-04-28 RX ADMIN — OXYCODONE HYDROCHLORIDE 10 MILLIGRAM(S): 5 TABLET ORAL at 17:16

## 2018-04-28 RX ADMIN — OXYCODONE HYDROCHLORIDE 10 MILLIGRAM(S): 5 TABLET ORAL at 21:25

## 2018-04-28 RX ADMIN — Medication 975 MILLIGRAM(S): at 13:29

## 2018-04-28 RX ADMIN — Medication 100 MILLIGRAM(S): at 23:14

## 2018-04-28 RX ADMIN — Medication 600 MILLIGRAM(S): at 13:26

## 2018-04-28 RX ADMIN — ATORVASTATIN CALCIUM 80 MILLIGRAM(S): 80 TABLET, FILM COATED ORAL at 21:25

## 2018-04-28 RX ADMIN — Medication 1000 MILLIGRAM(S): at 00:29

## 2018-04-28 RX ADMIN — Medication 0.5 MILLIGRAM(S): at 09:55

## 2018-04-28 RX ADMIN — Medication 600 MILLIGRAM(S): at 23:40

## 2018-04-28 RX ADMIN — HEPARIN SODIUM 5000 UNIT(S): 5000 INJECTION INTRAVENOUS; SUBCUTANEOUS at 05:55

## 2018-04-28 RX ADMIN — Medication 0.5 MILLIGRAM(S): at 19:39

## 2018-04-28 RX ADMIN — Medication 975 MILLIGRAM(S): at 21:25

## 2018-04-28 RX ADMIN — OXYCODONE HYDROCHLORIDE 5 MILLIGRAM(S): 5 TABLET ORAL at 12:12

## 2018-04-28 NOTE — DISCHARGE NOTE ADULT - CARE PROVIDER_API CALL
Beni Grover), ColonRectal Surgery; Surgery  [address] [facility]  [address] [place] [id number]  Phone: [phone]  Fax: [phone]

## 2018-04-28 NOTE — PROGRESS NOTE ADULT - SUBJECTIVE AND OBJECTIVE BOX
Roro Coverage    CC: feels well postop, no complaints    TELEMETRY:     PHYSICAL EXAM:    T(C): 36.5 (04-28-18 @ 05:19), Max: 36.6 (04-27-18 @ 22:39)  HR: 75 (04-28-18 @ 05:19) (64 - 82)  BP: 93/55 (04-28-18 @ 05:19) (80/50 - 119/68)  RR: 18 (04-28-18 @ 05:19) (12 - 18)  SpO2: 98% (04-28-18 @ 05:19) (93% - 100%)  Wt(kg): --  I&O's Summary    27 Apr 2018 07:01  -  28 Apr 2018 07:00  --------------------------------------------------------  IN: 1870 mL / OUT: 1875 mL / NET: -5 mL    28 Apr 2018 07:01  -  28 Apr 2018 09:27  --------------------------------------------------------  IN: 0 mL / OUT: 450 mL / NET: -450 mL        Appearance: Normal	  Cardiovascular: Normal S1 S2,RRR, No JVD, No murmurs  Respiratory: Lungs clear to auscultation	  Gastrointestinal:  Soft, Non-tender, + BS	  Extremities: Normal range of motion, No clubbing, cyanosis or edema  Vascular: Peripheral pulses palpable 2+ bilaterally     LABS:	 	                          13.8   19.2  )-----------( 234      ( 27 Apr 2018 15:10 )             42.5     04-28    135  |  98  |  19  ----------------------------<  116<H>  4.0   |  28  |  1.16    Ca    8.3<L>      28 Apr 2018 07:37  Phos  3.2     04-28  Mg     2.0     04-28            CARDIAC MARKERS:

## 2018-04-28 NOTE — DISCHARGE NOTE ADULT - HOSPITAL COURSE
The patient is a 62 yr old male with HTN, Hyperlipidemia, PAD - left calf stent, restless leg syndrome, Anxiety & Depression,  Diverticulitis multiple episodes with persistent extraluminal collection and sigmoid sigmoid fistula- last one March 2018- Treated with antibiotics. He presented for scheduled surgery and on 4/27/18, he underwent a low anterior resection with end-to-end anastomosis. He tolerated the procedure well. On POD#1, his lewis and remaining Ucath were removed, he was advanced to a regular diet, he was transitioned to oral pain medications, and he was ambulating. At discharge, his penrose was removed and he felt comfortable following up with Dr. Grover as an outpatient.

## 2018-04-28 NOTE — PROGRESS NOTE ADULT - SUBJECTIVE AND OBJECTIVE BOX
Day __1_ of Anesthesia Pain Management Service    SUBJECTIVE:  Pain Scale Score	At rest: ___ 	With Activity: ___ 	[ x] Refer to charted pain scores    THERAPY:    s/p _____0.5___ mg PF morphine on ______      MEDICATIONS  (STANDING):  acetaminophen   Tablet. 975 milliGRAM(s) Oral every 6 hours  atorvastatin 80 milliGRAM(s) Oral at bedtime  heparin  Injectable 5000 Unit(s) SubCutaneous every 8 hours  magnesium oxide 1000 milliGRAM(s) Oral two times a day with meals  nicotine -   7 mG/24Hr(s) Patch 1 patch Transdermal daily  rOPINIRole 1 milliGRAM(s) Oral three times a day  sertraline 200 milliGRAM(s) Oral daily  tamsulosin 0.4 milliGRAM(s) Oral daily  traZODone 100 milliGRAM(s) Oral at bedtime    MEDICATIONS  (PRN):  oxyCODONE    IR 5 milliGRAM(s) Oral every 4 hours PRN Moderate Pain (4 - 6)  oxyCODONE    IR 10 milliGRAM(s) Oral every 4 hours PRN Severe Pain (7 - 10)      OBJECTIVE:    Sedation Score:	[ x] Alert	[ ] Drowsy	[ ] Arousable	[ ] Asleep	[ ] Unresponsive    Side Effects:	[ x] None	[ ] Nausea	[ ] Vomiting	[ ] Pruritus  		  [ ] Weakness		[ ] Numbness	[ ] Other:    Vital Signs Last 24 Hrs  T(C): 36.5 (28 Apr 2018 05:19), Max: 36.6 (27 Apr 2018 22:39)  T(F): 97.7 (28 Apr 2018 05:19), Max: 97.9 (27 Apr 2018 22:39)  HR: 75 (28 Apr 2018 05:19) (64 - 82)  BP: 93/55 (28 Apr 2018 05:19) (80/50 - 119/68)  BP(mean): 75 (27 Apr 2018 20:00) (58 - 88)  RR: 18 (28 Apr 2018 05:19) (12 - 18)  SpO2: 98% (28 Apr 2018 05:19) (93% - 100%)    ASSESSMENT/ PLAN  [ x] Patient transitioned to prn analgesics  [ ] Pain management per primary service, pain service to sign off   [ ]Documentation and Verification of current medications     Comments:

## 2018-04-28 NOTE — DISCHARGE NOTE ADULT - MEDICATION SUMMARY - MEDICATIONS TO TAKE
I will START or STAY ON the medications listed below when I get home from the hospital:    acetaminophen 325 mg oral tablet  -- 2 tab(s) by mouth every 6 hours, As Needed for mild pain  -- Indication: For mild pain    ibuprofen 600 mg oral tablet  -- 1 tab(s) by mouth every 6 hours, As Needed for mild pain  -- Indication: For mild pain    Percocet 5/325 oral tablet  -- 1 tab(s) by mouth every 6 hours, As Needed MDD:4 tabs for moderate-severe pain  -- Caution federal law prohibits the transfer of this drug to any person other  than the person for whom it was prescribed.  May cause drowsiness.  Alcohol may intensify this effect.  Use care when operating dangerous machinery.  This prescription cannot be refilled.  This product contains acetaminophen.  Do not use  with any other product containing acetaminophen to prevent possible liver damage.  Using more of this medication than prescribed may cause serious breathing problems.    -- Indication: For moderate-severe pain    tamsulosin 0.4 mg oral capsule  -- 1 cap(s) by mouth once a day  -- Indication: For urinary retention    traZODone 100 mg oral tablet  -- 1 tab(s) by mouth once a day (at bedtime)  -- Indication: For Anti-depressant    sertraline 100 mg oral tablet  -- 2 tab(s) by mouth once a day  -- Indication: For Anti-depressant    rosuvastatin 20 mg oral tablet  -- 1 tab(s) by mouth once a day (at bedtime)  -- Indication: For cholesterol    rOPINIRole 1 mg oral tablet  -- 1 tab(s) by mouth 3 times a day  -- Indication: For Restless leg syndrome    clopidogrel 75 mg oral tablet  -- 1 tab(s) by mouth once a day  -- Indication: For Stent    ALPRAZolam 0.5 mg oral tablet  -- 1 tab(s) by mouth 3 times a day  -- Indication: For Anxiety    simethicone 80 mg oral tablet, chewable  -- 1 tab(s) by mouth 4 times a day, As Needed for gas  -- Indication: For gas

## 2018-04-28 NOTE — PROGRESS NOTE ADULT - ASSESSMENT
A: 62y Male s/p LAR.    P:  - ERP  - SQH DVT ppx  - f/u Cr  - CLD currently  - f/u morning labs A: 62y Male s/p LAR.    P:  - ERP  - SQH DVT ppx  - f/u Cr  - LRD  - d/c lewis  - f/u morning labs

## 2018-04-28 NOTE — PROGRESS NOTE ADULT - SUBJECTIVE AND OBJECTIVE BOX
GREEN TEAM SURGERY DAILY PROGRESS NOTE:    S: Patient seen and examined. No acute events overnight. Pain well controlled with current regimen.     O:  Vital Signs Last 24 Hrs  T(C): 36.5 (28 Apr 2018 05:19), Max: 36.6 (27 Apr 2018 22:39)  T(F): 97.7 (28 Apr 2018 05:19), Max: 97.9 (27 Apr 2018 22:39)  HR: 75 (28 Apr 2018 05:19) (64 - 82)  BP: 93/55 (28 Apr 2018 05:19) (80/50 - 119/68)  BP(mean): 75 (27 Apr 2018 20:00) (58 - 88)  RR: 18 (28 Apr 2018 05:19) (12 - 18)  SpO2: 98% (28 Apr 2018 05:19) (93% - 100%)    I&O's Detail    27 Apr 2018 07:01  -  28 Apr 2018 07:00  --------------------------------------------------------  IN:    IV PiggyBack: 100 mL    lactated ringers.: 660 mL    Oral Fluid: 1110 mL  Total IN: 1870 mL    OUT:    Indwelling Catheter - Urethral: 1875 mL  Total OUT: 1875 mL    Total NET: -5 mL    MEDICATIONS  (STANDING):  acetaminophen  IVPB. 1000 milliGRAM(s) IV Intermittent once  acetaminophen  IVPB. 1000 milliGRAM(s) IV Intermittent once  atorvastatin 80 milliGRAM(s) Oral at bedtime  heparin  Injectable 5000 Unit(s) SubCutaneous every 8 hours  nicotine -   7 mG/24Hr(s) Patch 1 patch Transdermal daily  rOPINIRole 1 milliGRAM(s) Oral three times a day  sertraline 200 milliGRAM(s) Oral daily  traZODone 100 milliGRAM(s) Oral at bedtime    MEDICATIONS  (PRN):  oxyCODONE    IR 5 milliGRAM(s) Oral every 4 hours PRN Moderate Pain (4 - 6)  oxyCODONE    IR 10 milliGRAM(s) Oral every 4 hours PRN Severe Pain (7 - 10)                        13.8   19.2  )-----------( 234      ( 27 Apr 2018 15:10 )             42.5   04-27    137  |  94<L>  |  20  ----------------------------<  140<H>  4.7   |  24  |  1.43<H>    Ca    8.5      27 Apr 2018 17:05  Phos  4.7     04-27  Mg     1.8     04-27    Physical Exam:  General: NAD, resting comfortably in bed  Neuro: A/O x 3, no focal deficits  Pulmonary: Nonlabored breathing, no respiratory distress  Cardiovascular: NSR  Abdominal: soft, ATTP. ND  Incision: C/D/I   Extremities: WWP  : lewis and u cath in place draining fruit punch colored urine    Lines:   IV: patent, in place. GREEN TEAM SURGERY DAILY PROGRESS NOTE:    S: Patient seen and examined. No acute events overnight. Pain well controlled with current regimen.     O:  Vital Signs Last 24 Hrs  T(C): 36.5 (28 Apr 2018 05:19), Max: 36.6 (27 Apr 2018 22:39)  T(F): 97.7 (28 Apr 2018 05:19), Max: 97.9 (27 Apr 2018 22:39)  HR: 75 (28 Apr 2018 05:19) (64 - 82)  BP: 93/55 (28 Apr 2018 05:19) (80/50 - 119/68)  BP(mean): 75 (27 Apr 2018 20:00) (58 - 88)  RR: 18 (28 Apr 2018 05:19) (12 - 18)  SpO2: 98% (28 Apr 2018 05:19) (93% - 100%)    I&O's Detail    27 Apr 2018 07:01  -  28 Apr 2018 07:00  --------------------------------------------------------  IN:    IV PiggyBack: 100 mL    lactated ringers.: 660 mL    Oral Fluid: 1110 mL  Total IN: 1870 mL    OUT:    Indwelling Catheter - Urethral: 1875 mL  Total OUT: 1875 mL    Total NET: -5 mL    MEDICATIONS  (STANDING):  acetaminophen  IVPB. 1000 milliGRAM(s) IV Intermittent once  acetaminophen  IVPB. 1000 milliGRAM(s) IV Intermittent once  atorvastatin 80 milliGRAM(s) Oral at bedtime  heparin  Injectable 5000 Unit(s) SubCutaneous every 8 hours  nicotine -   7 mG/24Hr(s) Patch 1 patch Transdermal daily  rOPINIRole 1 milliGRAM(s) Oral three times a day  sertraline 200 milliGRAM(s) Oral daily  traZODone 100 milliGRAM(s) Oral at bedtime    MEDICATIONS  (PRN):  oxyCODONE    IR 5 milliGRAM(s) Oral every 4 hours PRN Moderate Pain (4 - 6)  oxyCODONE    IR 10 milliGRAM(s) Oral every 4 hours PRN Severe Pain (7 - 10)                        13.8   19.2  )-----------( 234      ( 27 Apr 2018 15:10 )             42.5   04-27    137  |  94<L>  |  20  ----------------------------<  140<H>  4.7   |  24  |  1.43<H>    Ca    8.5      27 Apr 2018 17:05  Phos  4.7     04-27  Mg     1.8     04-27    Physical Exam:  General: NAD, resting comfortably in bed  Neuro: A/O x 3, no focal deficits  Pulmonary: Nonlabored breathing, no respiratory distress  Cardiovascular: NSR  Abdominal: soft, mildly distended, midline incision with penrose, port sites covered with Steris c/d/i  Incision: C/D/I   Extremities: WWP  : lewis and u cath in place draining fruit punch colored urine    Lines:   IV: patent, in place.

## 2018-04-28 NOTE — DISCHARGE NOTE ADULT - PATIENT PORTAL LINK FT
You can access the [facility] Patient Portal, offered by [facility], by registering with the following website: [email or web address]

## 2018-04-28 NOTE — DISCHARGE NOTE ADULT - PLAN OF CARE
recovery from surgery You have had surgery on your intestines. You can shower over your incisions, but do not soak or scrub. You may take pain medication as needed, but do not drive while taking narcotics. Please call the office if you have a fever >101F, cannot tolerate food, are unable to urine or have a bowel movement, or have any other concerns. Otherwise, please call to schedule an appointment with Dr. Grover in 1-2 weeks. control of anxiety Please continue your home anxiety medications and follow-up with the provider who prescribes them. control of blood cholesterol Please continue your home statin and follow-up with the provider who prescribes it. You may re-start Plavix on 4/29.

## 2018-04-28 NOTE — DISCHARGE NOTE ADULT - CARE PLAN
Principal Discharge DX:	Diverticulitis  Goal:	recovery from surgery  Assessment and plan of treatment:	You have had surgery on your intestines. You can shower over your incisions, but do not soak or scrub. You may take pain medication as needed, but do not drive while taking narcotics. Please call the office if you have a fever >101F, cannot tolerate food, are unable to urine or have a bowel movement, or have any other concerns. Otherwise, please call to schedule an appointment with Dr. Grover in 1-2 weeks.  Secondary Diagnosis:	Anxiety  Goal:	control of anxiety  Assessment and plan of treatment:	Please continue your home anxiety medications and follow-up with the provider who prescribes them.  Secondary Diagnosis:	Hyperlipidemia  Goal:	control of blood cholesterol  Assessment and plan of treatment:	Please continue your home statin and follow-up with the provider who prescribes it. You may re-start Plavix on 4/29.

## 2018-04-29 VITALS
DIASTOLIC BLOOD PRESSURE: 67 MMHG | TEMPERATURE: 98 F | RESPIRATION RATE: 18 BRPM | OXYGEN SATURATION: 97 % | HEART RATE: 85 BPM | SYSTOLIC BLOOD PRESSURE: 121 MMHG

## 2018-04-29 LAB
ANION GAP SERPL CALC-SCNC: 10 MMOL/L — SIGNIFICANT CHANGE UP (ref 5–17)
BUN SERPL-MCNC: 18 MG/DL — SIGNIFICANT CHANGE UP (ref 7–23)
CALCIUM SERPL-MCNC: 8.5 MG/DL — SIGNIFICANT CHANGE UP (ref 8.4–10.5)
CHLORIDE SERPL-SCNC: 100 MMOL/L — SIGNIFICANT CHANGE UP (ref 96–108)
CO2 SERPL-SCNC: 28 MMOL/L — SIGNIFICANT CHANGE UP (ref 22–31)
CREAT SERPL-MCNC: 1.15 MG/DL — SIGNIFICANT CHANGE UP (ref 0.5–1.3)
GLUCOSE SERPL-MCNC: 102 MG/DL — HIGH (ref 70–99)
HCT VFR BLD CALC: 33 % — LOW (ref 39–50)
HGB BLD-MCNC: 10.4 G/DL — LOW (ref 13–17)
MAGNESIUM SERPL-MCNC: 2.2 MG/DL — SIGNIFICANT CHANGE UP (ref 1.6–2.6)
MCHC RBC-ENTMCNC: 27.5 PG — SIGNIFICANT CHANGE UP (ref 27–34)
MCHC RBC-ENTMCNC: 31.5 GM/DL — LOW (ref 32–36)
MCV RBC AUTO: 87.3 FL — SIGNIFICANT CHANGE UP (ref 80–100)
PHOSPHATE SERPL-MCNC: 2.1 MG/DL — LOW (ref 2.5–4.5)
PLATELET # BLD AUTO: 198 K/UL — SIGNIFICANT CHANGE UP (ref 150–400)
POTASSIUM SERPL-MCNC: 4.4 MMOL/L — SIGNIFICANT CHANGE UP (ref 3.5–5.3)
POTASSIUM SERPL-SCNC: 4.4 MMOL/L — SIGNIFICANT CHANGE UP (ref 3.5–5.3)
RBC # BLD: 3.78 M/UL — LOW (ref 4.2–5.8)
RBC # FLD: 16.2 % — HIGH (ref 10.3–14.5)
SODIUM SERPL-SCNC: 138 MMOL/L — SIGNIFICANT CHANGE UP (ref 135–145)
WBC # BLD: 8.44 K/UL — SIGNIFICANT CHANGE UP (ref 3.8–10.5)
WBC # FLD AUTO: 8.44 K/UL — SIGNIFICANT CHANGE UP (ref 3.8–10.5)

## 2018-04-29 PROCEDURE — 74018 RADEX ABDOMEN 1 VIEW: CPT

## 2018-04-29 PROCEDURE — C1889: CPT

## 2018-04-29 PROCEDURE — 88304 TISSUE EXAM BY PATHOLOGIST: CPT

## 2018-04-29 PROCEDURE — C1758: CPT

## 2018-04-29 PROCEDURE — 86900 BLOOD TYPING SEROLOGIC ABO: CPT

## 2018-04-29 PROCEDURE — 82962 GLUCOSE BLOOD TEST: CPT

## 2018-04-29 PROCEDURE — 88307 TISSUE EXAM BY PATHOLOGIST: CPT

## 2018-04-29 PROCEDURE — 83735 ASSAY OF MAGNESIUM: CPT

## 2018-04-29 PROCEDURE — 84100 ASSAY OF PHOSPHORUS: CPT

## 2018-04-29 PROCEDURE — 80048 BASIC METABOLIC PNL TOTAL CA: CPT

## 2018-04-29 PROCEDURE — 88305 TISSUE EXAM BY PATHOLOGIST: CPT

## 2018-04-29 PROCEDURE — 85027 COMPLETE CBC AUTOMATED: CPT

## 2018-04-29 PROCEDURE — 86901 BLOOD TYPING SEROLOGIC RH(D): CPT

## 2018-04-29 RX ADMIN — OXYCODONE HYDROCHLORIDE 5 MILLIGRAM(S): 5 TABLET ORAL at 02:37

## 2018-04-29 RX ADMIN — Medication 975 MILLIGRAM(S): at 02:36

## 2018-04-29 RX ADMIN — HEPARIN SODIUM 5000 UNIT(S): 5000 INJECTION INTRAVENOUS; SUBCUTANEOUS at 05:00

## 2018-04-29 RX ADMIN — Medication 600 MILLIGRAM(S): at 05:30

## 2018-04-29 RX ADMIN — Medication 600 MILLIGRAM(S): at 05:00

## 2018-04-29 RX ADMIN — ROPINIROLE 1 MILLIGRAM(S): 8 TABLET, FILM COATED, EXTENDED RELEASE ORAL at 04:59

## 2018-04-29 RX ADMIN — OXYCODONE HYDROCHLORIDE 5 MILLIGRAM(S): 5 TABLET ORAL at 09:28

## 2018-04-29 RX ADMIN — MAGNESIUM OXIDE 400 MG ORAL TABLET 1000 MILLIGRAM(S): 241.3 TABLET ORAL at 09:26

## 2018-04-29 RX ADMIN — OXYCODONE HYDROCHLORIDE 5 MILLIGRAM(S): 5 TABLET ORAL at 02:54

## 2018-04-29 RX ADMIN — SIMETHICONE 80 MILLIGRAM(S): 80 TABLET, CHEWABLE ORAL at 05:00

## 2018-04-29 RX ADMIN — Medication 975 MILLIGRAM(S): at 02:54

## 2018-04-29 NOTE — PROGRESS NOTE ADULT - SUBJECTIVE AND OBJECTIVE BOX
Roro Coverage    CC: feeling better, no cp/sob    TELEMETRY:     PHYSICAL EXAM:    T(C): 36.7 (04-29-18 @ 05:03), Max: 36.8 (04-28-18 @ 16:58)  HR: 76 (04-29-18 @ 05:03) (72 - 76)  BP: 109/62 (04-29-18 @ 05:03) (93/58 - 109/62)  RR: 18 (04-29-18 @ 05:03) (18 - 18)  SpO2: 97% (04-29-18 @ 05:03) (95% - 98%)  Wt(kg): --  I&O's Summary    28 Apr 2018 07:01  -  29 Apr 2018 07:00  --------------------------------------------------------  IN: 1240 mL / OUT: 950 mL / NET: 290 mL        Appearance: Normal	  Cardiovascular: Normal S1 S2,RRR, No JVD, No murmurs  Respiratory: Lungs clear to auscultation	  Gastrointestinal:  Soft, Non-tender, + BS	  Extremities: Normal range of motion, No clubbing, cyanosis or edema  Vascular: Peripheral pulses palpable 2+ bilaterally     LABS:	 	                          11.2   11.3  )-----------( 189      ( 28 Apr 2018 13:30 )             33.8     04-29    138  |  100  |  18  ----------------------------<  102<H>  4.4   |  28  |  1.15    Ca    8.5      29 Apr 2018 07:11  Phos  2.1     04-29  Mg     2.2     04-29            CARDIAC MARKERS:

## 2018-04-29 NOTE — PROGRESS NOTE ADULT - SUBJECTIVE AND OBJECTIVE BOX
GREEN TEAM SURGERY DAILY PROGRESS NOTE:    S: Patient seen and examined. No acute events overnight. Pain well controlled with current regimen.     O:  Vital Signs Last 24 Hrs  T(C): 36.7 (29 Apr 2018 05:03), Max: 36.8 (28 Apr 2018 16:58)  T(F): 98 (29 Apr 2018 05:03), Max: 98.3 (28 Apr 2018 16:58)  HR: 76 (29 Apr 2018 05:03) (72 - 76)  BP: 109/62 (29 Apr 2018 05:03) (93/58 - 109/62)  BP(mean): --  RR: 18 (29 Apr 2018 05:03) (18 - 18)  SpO2: 97% (29 Apr 2018 05:03) (95% - 98%)    I&O's Detail    28 Apr 2018 07:01  -  29 Apr 2018 07:00  --------------------------------------------------------  IN:    Oral Fluid: 1240 mL  Total IN: 1240 mL    OUT:    Indwelling Catheter - Urethral: 550 mL    Voided: 400 mL  Total OUT: 950 mL    Total NET: 290 mL    MEDICATIONS  (STANDING):  acetaminophen   Tablet. 975 milliGRAM(s) Oral every 6 hours  atorvastatin 80 milliGRAM(s) Oral at bedtime  heparin  Injectable 5000 Unit(s) SubCutaneous every 8 hours  ibuprofen  Tablet 600 milliGRAM(s) Oral every 6 hours  magnesium oxide 1000 milliGRAM(s) Oral two times a day with meals  nicotine -   7 mG/24Hr(s) Patch 1 patch Transdermal daily  rOPINIRole 1 milliGRAM(s) Oral three times a day  sertraline 100 milliGRAM(s) Oral daily  simethicone 80 milliGRAM(s) Chew four times a day  tamsulosin 0.4 milliGRAM(s) Oral daily  traZODone 100 milliGRAM(s) Oral at bedtime    MEDICATIONS  (PRN):  ALPRAZolam 0.5 milliGRAM(s) Oral three times a day PRN Anxiety  oxyCODONE    IR 5 milliGRAM(s) Oral every 4 hours PRN Moderate Pain (4 - 6)  oxyCODONE    IR 10 milliGRAM(s) Oral every 4 hours PRN Severe Pain (7 - 10)                          11.2   11.3  )-----------( 189      ( 28 Apr 2018 13:30 )             33.8     04-29    138  |  100  |  18  ----------------------------<  102<H>  4.4   |  28  |  1.15    Ca    8.5      29 Apr 2018 07:11  Phos  2.1     04-29  Mg     2.2     04-29    Physical Exam:  General: NAD, resting comfortably in bed  Neuro: A/O x 3, no focal deficits  Pulmonary: Nonlabored breathing, no respiratory distress  Cardiovascular: NSR  Abdominal: soft, mildly distended, midline incision with penrose removed, port sites covered with Steris c/d/i  Incision: C/D/I   Extremities: WWP    Lines:   IV: patent, in place.

## 2018-04-29 NOTE — PROGRESS NOTE ADULT - ATTENDING COMMENTS
I have seen and examined the patient. I agree with the above surgery resident's note.  d/c home
I have seen and evaluated patient and I agree with resident assessment and plan. Continue enhanced recovery protocol.

## 2018-04-30 RX ORDER — CIPROFLOXACIN HYDROCHLORIDE 250 MG/1
250 TABLET, FILM COATED ORAL
Qty: 3 | Refills: 0 | Status: DISCONTINUED | COMMUNITY
Start: 2018-03-29 | End: 2018-04-30

## 2018-04-30 RX ORDER — METRONIDAZOLE 250 MG/1
250 TABLET ORAL
Qty: 3 | Refills: 0 | Status: DISCONTINUED | COMMUNITY
Start: 2018-03-29 | End: 2018-04-30

## 2018-05-01 ENCOUNTER — APPOINTMENT (OUTPATIENT)
Dept: SURGERY | Facility: HOSPITAL | Age: 63
End: 2018-05-01

## 2018-05-02 LAB — SURGICAL PATHOLOGY STUDY: SIGNIFICANT CHANGE UP

## 2018-05-06 ENCOUNTER — FORM ENCOUNTER (OUTPATIENT)
Age: 63
End: 2018-05-06

## 2018-05-07 ENCOUNTER — APPOINTMENT (OUTPATIENT)
Dept: CT IMAGING | Facility: CLINIC | Age: 63
End: 2018-05-07
Payer: COMMERCIAL

## 2018-05-07 ENCOUNTER — APPOINTMENT (OUTPATIENT)
Dept: SURGERY | Facility: CLINIC | Age: 63
End: 2018-05-07
Payer: COMMERCIAL

## 2018-05-07 ENCOUNTER — OUTPATIENT (OUTPATIENT)
Dept: OUTPATIENT SERVICES | Facility: HOSPITAL | Age: 63
LOS: 1 days | End: 2018-05-07
Payer: COMMERCIAL

## 2018-05-07 VITALS
TEMPERATURE: 97.7 F | RESPIRATION RATE: 15 BRPM | DIASTOLIC BLOOD PRESSURE: 71 MMHG | HEART RATE: 72 BPM | SYSTOLIC BLOOD PRESSURE: 115 MMHG | OXYGEN SATURATION: 100 %

## 2018-05-07 DIAGNOSIS — Z98.890 OTHER SPECIFIED POSTPROCEDURAL STATES: Chronic | ICD-10-CM

## 2018-05-07 DIAGNOSIS — I73.9 PERIPHERAL VASCULAR DISEASE, UNSPECIFIED: Chronic | ICD-10-CM

## 2018-05-07 DIAGNOSIS — R19.00 INTRA-ABDOMINAL AND PELVIC SWELLING, MASS AND LUMP, UNSPECIFIED SITE: ICD-10-CM

## 2018-05-07 DIAGNOSIS — Z87.19 PERSONAL HISTORY OF OTHER DISEASES OF THE DIGESTIVE SYSTEM: ICD-10-CM

## 2018-05-07 DIAGNOSIS — R14.0 ABDOMINAL DISTENSION (GASEOUS): ICD-10-CM

## 2018-05-07 DIAGNOSIS — Z00.8 ENCOUNTER FOR OTHER GENERAL EXAMINATION: ICD-10-CM

## 2018-05-07 PROCEDURE — 74177 CT ABD & PELVIS W/CONTRAST: CPT

## 2018-05-07 PROCEDURE — 99024 POSTOP FOLLOW-UP VISIT: CPT

## 2018-05-07 PROCEDURE — 74177 CT ABD & PELVIS W/CONTRAST: CPT | Mod: 26

## 2018-05-07 RX ORDER — OXYCODONE AND ACETAMINOPHEN 5; 325 MG/1; MG/1
5-325 TABLET ORAL
Qty: 16 | Refills: 0 | Status: DISCONTINUED | COMMUNITY
Start: 2018-04-28

## 2018-05-07 RX ORDER — ROSUVASTATIN CALCIUM 40 MG/1
40 TABLET, FILM COATED ORAL
Qty: 90 | Refills: 0 | Status: ACTIVE | COMMUNITY
Start: 2018-04-13

## 2018-05-07 RX ORDER — METOPROLOL SUCCINATE 25 MG/1
25 TABLET, EXTENDED RELEASE ORAL
Qty: 30 | Refills: 0 | Status: DISCONTINUED | COMMUNITY
Start: 2018-04-26

## 2018-05-08 ENCOUNTER — APPOINTMENT (OUTPATIENT)
Dept: SURGERY | Facility: CLINIC | Age: 63
End: 2018-05-08
Payer: COMMERCIAL

## 2018-05-08 VITALS
HEART RATE: 79 BPM | OXYGEN SATURATION: 100 % | DIASTOLIC BLOOD PRESSURE: 74 MMHG | WEIGHT: 207 LBS | HEIGHT: 62 IN | TEMPERATURE: 97.7 F | BODY MASS INDEX: 38.09 KG/M2 | RESPIRATION RATE: 16 BRPM | SYSTOLIC BLOOD PRESSURE: 116 MMHG

## 2018-05-08 DIAGNOSIS — Z22.322 CARRIER OR SUSPECTED CARRIER OF METHICILLIN RESISTANT STAPHYLOCOCCUS AUREUS: ICD-10-CM

## 2018-05-08 DIAGNOSIS — Z22.321 CARRIER OR SUSPECTED CARRIER OF METHICILLIN SUSCEPTIBLE STAPHYLOCOCCUS AUREUS: ICD-10-CM

## 2018-05-08 PROCEDURE — 99244 OFF/OP CNSLTJ NEW/EST MOD 40: CPT

## 2018-05-09 LAB
MRSA SPEC QL CULT: NOT DETECTED
STAPH AUREUS (SA): DETECTED

## 2018-05-10 ENCOUNTER — APPOINTMENT (OUTPATIENT)
Dept: SURGERY | Facility: CLINIC | Age: 63
End: 2018-05-10

## 2018-05-10 ENCOUNTER — TRANSCRIPTION ENCOUNTER (OUTPATIENT)
Age: 63
End: 2018-05-10

## 2018-05-10 RX ORDER — CEFAZOLIN SODIUM 1 G
2000 VIAL (EA) INJECTION ONCE
Qty: 0 | Refills: 0 | Status: DISCONTINUED | OUTPATIENT
Start: 2018-05-11 | End: 2018-05-12

## 2018-05-11 ENCOUNTER — APPOINTMENT (OUTPATIENT)
Dept: SURGERY | Facility: HOSPITAL | Age: 63
End: 2018-05-11
Payer: COMMERCIAL

## 2018-05-11 ENCOUNTER — INPATIENT (INPATIENT)
Facility: HOSPITAL | Age: 63
LOS: 0 days | Discharge: HOME | DRG: 355 | End: 2018-05-12
Attending: SURGERY | Admitting: SURGERY
Payer: COMMERCIAL

## 2018-05-11 VITALS
WEIGHT: 205.91 LBS | OXYGEN SATURATION: 98 % | RESPIRATION RATE: 20 BRPM | SYSTOLIC BLOOD PRESSURE: 124 MMHG | HEIGHT: 74 IN | TEMPERATURE: 98 F | HEART RATE: 72 BPM | DIASTOLIC BLOOD PRESSURE: 79 MMHG

## 2018-05-11 DIAGNOSIS — Z90.49 ACQUIRED ABSENCE OF OTHER SPECIFIED PARTS OF DIGESTIVE TRACT: Chronic | ICD-10-CM

## 2018-05-11 DIAGNOSIS — I73.9 PERIPHERAL VASCULAR DISEASE, UNSPECIFIED: Chronic | ICD-10-CM

## 2018-05-11 DIAGNOSIS — K43.2 INCISIONAL HERNIA WITHOUT OBSTRUCTION OR GANGRENE: ICD-10-CM

## 2018-05-11 DIAGNOSIS — Z98.890 OTHER SPECIFIED POSTPROCEDURAL STATES: Chronic | ICD-10-CM

## 2018-05-11 PROBLEM — Z22.321 MSSA (METHICILLIN-SUSCEPTIBLE STAPH AUREUS) CARRIER: Status: ACTIVE | Noted: 2018-05-11

## 2018-05-11 LAB
BLD GP AB SCN SERPL QL: NEGATIVE — SIGNIFICANT CHANGE UP
GLUCOSE BLDC GLUCOMTR-MCNC: 108 MG/DL — HIGH (ref 70–99)
HCT VFR BLD CALC: 39.1 % — SIGNIFICANT CHANGE UP (ref 39–50)
HCT VFR BLD CALC: 39.5 % — SIGNIFICANT CHANGE UP (ref 39–50)
HGB BLD-MCNC: 12.5 G/DL — LOW (ref 13–17)
HGB BLD-MCNC: 12.6 G/DL — LOW (ref 13–17)
MCHC RBC-ENTMCNC: 27.9 PG — SIGNIFICANT CHANGE UP (ref 27–34)
MCHC RBC-ENTMCNC: 28.1 PG — SIGNIFICANT CHANGE UP (ref 27–34)
MCHC RBC-ENTMCNC: 31.8 GM/DL — LOW (ref 32–36)
MCHC RBC-ENTMCNC: 31.9 GM/DL — LOW (ref 32–36)
MCV RBC AUTO: 87.6 FL — SIGNIFICANT CHANGE UP (ref 80–100)
MCV RBC AUTO: 88.2 FL — SIGNIFICANT CHANGE UP (ref 80–100)
PLATELET # BLD AUTO: 420 K/UL — HIGH (ref 150–400)
PLATELET # BLD AUTO: 429 K/UL — HIGH (ref 150–400)
RBC # BLD: 4.43 M/UL — SIGNIFICANT CHANGE UP (ref 4.2–5.8)
RBC # BLD: 4.51 M/UL — SIGNIFICANT CHANGE UP (ref 4.2–5.8)
RBC # FLD: 14 % — SIGNIFICANT CHANGE UP (ref 10.3–14.5)
RBC # FLD: 14 % — SIGNIFICANT CHANGE UP (ref 10.3–14.5)
RH IG SCN BLD-IMP: POSITIVE — SIGNIFICANT CHANGE UP
WBC # BLD: 16 K/UL — HIGH (ref 3.8–10.5)
WBC # BLD: 17.1 K/UL — HIGH (ref 3.8–10.5)
WBC # FLD AUTO: 16 K/UL — HIGH (ref 3.8–10.5)
WBC # FLD AUTO: 17.1 K/UL — HIGH (ref 3.8–10.5)

## 2018-05-11 PROCEDURE — 49568: CPT

## 2018-05-11 PROCEDURE — 49560: CPT

## 2018-05-11 PROCEDURE — 97605 NEG PRS WND THER DME<=50SQCM: CPT

## 2018-05-11 RX ORDER — TRAZODONE HCL 50 MG
1 TABLET ORAL
Qty: 0 | Refills: 0 | COMMUNITY

## 2018-05-11 RX ORDER — ALPRAZOLAM 0.25 MG
1 TABLET ORAL
Qty: 0 | Refills: 0 | COMMUNITY

## 2018-05-11 RX ORDER — ROSUVASTATIN CALCIUM 5 MG/1
1 TABLET ORAL
Qty: 0 | Refills: 0 | COMMUNITY

## 2018-05-11 RX ORDER — SERTRALINE 25 MG/1
200 TABLET, FILM COATED ORAL DAILY
Qty: 0 | Refills: 0 | Status: DISCONTINUED | OUTPATIENT
Start: 2018-05-11 | End: 2018-05-12

## 2018-05-11 RX ORDER — ENOXAPARIN SODIUM 100 MG/ML
40 INJECTION SUBCUTANEOUS EVERY 24 HOURS
Qty: 0 | Refills: 0 | Status: DISCONTINUED | OUTPATIENT
Start: 2018-05-11 | End: 2018-05-12

## 2018-05-11 RX ORDER — ROPINIROLE 8 MG/1
1 TABLET, FILM COATED, EXTENDED RELEASE ORAL THREE TIMES A DAY
Qty: 0 | Refills: 0 | Status: DISCONTINUED | OUTPATIENT
Start: 2018-05-11 | End: 2018-05-12

## 2018-05-11 RX ORDER — SODIUM CHLORIDE 9 MG/ML
1000 INJECTION, SOLUTION INTRAVENOUS
Qty: 0 | Refills: 0 | Status: DISCONTINUED | OUTPATIENT
Start: 2018-05-11 | End: 2018-05-11

## 2018-05-11 RX ORDER — ALPRAZOLAM 0.25 MG
0.5 TABLET ORAL THREE TIMES A DAY
Qty: 0 | Refills: 0 | Status: DISCONTINUED | OUTPATIENT
Start: 2018-05-11 | End: 2018-05-12

## 2018-05-11 RX ORDER — SODIUM CHLORIDE 9 MG/ML
1000 INJECTION, SOLUTION INTRAVENOUS
Qty: 0 | Refills: 0 | Status: DISCONTINUED | OUTPATIENT
Start: 2018-05-11 | End: 2018-05-12

## 2018-05-11 RX ORDER — OXYCODONE HYDROCHLORIDE 5 MG/1
10 TABLET ORAL EVERY 6 HOURS
Qty: 0 | Refills: 0 | Status: DISCONTINUED | OUTPATIENT
Start: 2018-05-11 | End: 2018-05-12

## 2018-05-11 RX ORDER — ONDANSETRON 8 MG/1
4 TABLET, FILM COATED ORAL ONCE
Qty: 0 | Refills: 0 | Status: DISCONTINUED | OUTPATIENT
Start: 2018-05-11 | End: 2018-05-11

## 2018-05-11 RX ORDER — OXYCODONE HYDROCHLORIDE 5 MG/1
5 TABLET ORAL EVERY 4 HOURS
Qty: 0 | Refills: 0 | Status: DISCONTINUED | OUTPATIENT
Start: 2018-05-11 | End: 2018-05-12

## 2018-05-11 RX ORDER — SODIUM CHLORIDE 9 MG/ML
3 INJECTION INTRAMUSCULAR; INTRAVENOUS; SUBCUTANEOUS EVERY 8 HOURS
Qty: 0 | Refills: 0 | Status: DISCONTINUED | OUTPATIENT
Start: 2018-05-11 | End: 2018-05-11

## 2018-05-11 RX ORDER — NICOTINE POLACRILEX 2 MG
1 GUM BUCCAL DAILY
Qty: 0 | Refills: 0 | Status: DISCONTINUED | OUTPATIENT
Start: 2018-05-11 | End: 2018-05-12

## 2018-05-11 RX ORDER — HYDROMORPHONE HYDROCHLORIDE 2 MG/ML
0.5 INJECTION INTRAMUSCULAR; INTRAVENOUS; SUBCUTANEOUS
Qty: 0 | Refills: 0 | Status: DISCONTINUED | OUTPATIENT
Start: 2018-05-11 | End: 2018-05-11

## 2018-05-11 RX ORDER — ACETAMINOPHEN 500 MG
650 TABLET ORAL EVERY 6 HOURS
Qty: 0 | Refills: 0 | Status: DISCONTINUED | OUTPATIENT
Start: 2018-05-11 | End: 2018-05-12

## 2018-05-11 RX ORDER — TRAZODONE HCL 50 MG
100 TABLET ORAL AT BEDTIME
Qty: 0 | Refills: 0 | Status: DISCONTINUED | OUTPATIENT
Start: 2018-05-11 | End: 2018-05-12

## 2018-05-11 RX ORDER — ROPINIROLE 8 MG/1
1 TABLET, FILM COATED, EXTENDED RELEASE ORAL
Qty: 0 | Refills: 0 | COMMUNITY

## 2018-05-11 RX ORDER — ATORVASTATIN CALCIUM 80 MG/1
80 TABLET, FILM COATED ORAL AT BEDTIME
Qty: 0 | Refills: 0 | Status: DISCONTINUED | OUTPATIENT
Start: 2018-05-11 | End: 2018-05-12

## 2018-05-11 RX ORDER — DIPHENHYDRAMINE HCL 50 MG
25 CAPSULE ORAL ONCE
Qty: 0 | Refills: 0 | Status: COMPLETED | OUTPATIENT
Start: 2018-05-11 | End: 2018-05-11

## 2018-05-11 RX ORDER — SERTRALINE 25 MG/1
2 TABLET, FILM COATED ORAL
Qty: 0 | Refills: 0 | COMMUNITY

## 2018-05-11 RX ADMIN — SODIUM CHLORIDE 125 MILLILITER(S): 9 INJECTION, SOLUTION INTRAVENOUS at 20:09

## 2018-05-11 RX ADMIN — OXYCODONE HYDROCHLORIDE 10 MILLIGRAM(S): 5 TABLET ORAL at 22:12

## 2018-05-11 RX ADMIN — HYDROMORPHONE HYDROCHLORIDE 0.5 MILLIGRAM(S): 2 INJECTION INTRAMUSCULAR; INTRAVENOUS; SUBCUTANEOUS at 16:15

## 2018-05-11 RX ADMIN — HYDROMORPHONE HYDROCHLORIDE 0.5 MILLIGRAM(S): 2 INJECTION INTRAMUSCULAR; INTRAVENOUS; SUBCUTANEOUS at 14:25

## 2018-05-11 RX ADMIN — Medication 1 PATCH: at 16:22

## 2018-05-11 RX ADMIN — ROPINIROLE 1 MILLIGRAM(S): 8 TABLET, FILM COATED, EXTENDED RELEASE ORAL at 14:40

## 2018-05-11 RX ADMIN — ENOXAPARIN SODIUM 40 MILLIGRAM(S): 100 INJECTION SUBCUTANEOUS at 21:13

## 2018-05-11 RX ADMIN — HYDROMORPHONE HYDROCHLORIDE 0.5 MILLIGRAM(S): 2 INJECTION INTRAMUSCULAR; INTRAVENOUS; SUBCUTANEOUS at 14:45

## 2018-05-11 RX ADMIN — HYDROMORPHONE HYDROCHLORIDE 0.5 MILLIGRAM(S): 2 INJECTION INTRAMUSCULAR; INTRAVENOUS; SUBCUTANEOUS at 16:00

## 2018-05-11 RX ADMIN — Medication 0.5 MILLIGRAM(S): at 14:42

## 2018-05-11 RX ADMIN — OXYCODONE HYDROCHLORIDE 10 MILLIGRAM(S): 5 TABLET ORAL at 21:12

## 2018-05-11 RX ADMIN — ROPINIROLE 1 MILLIGRAM(S): 8 TABLET, FILM COATED, EXTENDED RELEASE ORAL at 22:16

## 2018-05-11 RX ADMIN — ATORVASTATIN CALCIUM 80 MILLIGRAM(S): 80 TABLET, FILM COATED ORAL at 21:12

## 2018-05-11 RX ADMIN — HYDROMORPHONE HYDROCHLORIDE 0.5 MILLIGRAM(S): 2 INJECTION INTRAMUSCULAR; INTRAVENOUS; SUBCUTANEOUS at 14:10

## 2018-05-11 RX ADMIN — SODIUM CHLORIDE 75 MILLILITER(S): 9 INJECTION, SOLUTION INTRAVENOUS at 14:26

## 2018-05-11 RX ADMIN — Medication 650 MILLIGRAM(S): at 23:35

## 2018-05-11 RX ADMIN — HYDROMORPHONE HYDROCHLORIDE 0.5 MILLIGRAM(S): 2 INJECTION INTRAMUSCULAR; INTRAVENOUS; SUBCUTANEOUS at 15:00

## 2018-05-11 RX ADMIN — Medication 25 MILLIGRAM(S): at 20:08

## 2018-05-11 RX ADMIN — Medication 0.5 MILLIGRAM(S): at 21:12

## 2018-05-11 RX ADMIN — Medication 100 MILLIGRAM(S): at 21:12

## 2018-05-11 NOTE — PROVIDER CONTACT NOTE (OTHER) - ACTION/TREATMENT ORDERED:
Dr. De La Cruz came to see the patient and assessed the patient and changed the dsg.  Also ordered a CBC

## 2018-05-11 NOTE — H&P ADULT - PSH
H/O hernia repair  right inguinal  PAD (peripheral artery disease)  s/p left calf  artery stent  S/P colon resection    S/P Nissen fundoplication (without gastrostomy tube) procedure

## 2018-05-11 NOTE — CHART NOTE - NSCHARTNOTEFT_GEN_A_CORE
Post-operative Check    SUBJECTIVE: Some oozing from incision site. No gross bleeding. Patient denies any lightheadedness, dizziness, or weakness. Pain well controlled.     OBJECTIVE:  T(C): 36.6 (05-11-18 @ 17:15), Max: 37.1 (05-11-18 @ 13:40)  HR: 79 (05-11-18 @ 18:30) (72 - 85)  BP: 147/82 (05-11-18 @ 18:30) (124/79 - 176/83)  RR: 16 (05-11-18 @ 18:30) (14 - 20)  SpO2: 97% (05-11-18 @ 18:30) (93% - 100%)      05-11-18 @ 07:01  -  05-11-18 @ 18:52  --------------------------------------------------------  IN: 575 mL / OUT: 465 mL / NET: 110 mL        Physical Exam:   - Constitutional: AOx3, NAD  - CV: normotensive, regular rate   - Respiratory: nonlabored  - Abdomen: soft, nontender, nondistended, midline incision without active bleeding, dmitriy in place, RLQ drain with serosanguinous (sanguinous > serous) output   - Extremities: warm, well perfused     CBC (05-11 @ 18:27)                              12.5<L>                         17.1<H>  )----------------(  420<H>     --    % Neutrophils, --    % Lymphocytes, ANC: --                                  39.1                CBC (05-11 @ 15:21)                              12.6<L>                         16.0<H>  )----------------(  429<H>     --    % Neutrophils, --    % Lymphocytes, ANC: --                                  39.5                  ASSESSMENT:   CARLOS CUEVAS is a 62y Male POD#0 from incisional hernia repair with Ventrio ST mesh. Some bleeding post-operatively, but stable H&H.     PLAN:  - Pain management  - Follow UOP  - Follow ROBERT output quality and quantity   - Repeat CBC @ 0000 on 5/12   - Appropriate for transfer to the floor

## 2018-05-11 NOTE — H&P ADULT - HISTORY OF PRESENT ILLNESS
63 yo male who underwent lap LAR for diverticulitis 4/27 was discharged home 4/28. Outpatient complaining of dissimilarity of right and left side of abdomen along with lump. He was sent for CT scan that showed incisional hernia containing transverse colon. No obstructive symptoms no n/v, tolerating diet, passing gas/flatus. Presents to SD for incisional hernia repair

## 2018-05-11 NOTE — H&P ADULT - ATTENDING COMMENTS
I have seen and examined the patient.  I agree with the surgical resident's note.  there has been no change in the medical condition of the patient since I saw him in the office.    Devon Barrera contact information [phone]

## 2018-05-12 ENCOUNTER — TRANSCRIPTION ENCOUNTER (OUTPATIENT)
Age: 63
End: 2018-05-12

## 2018-05-12 VITALS
OXYGEN SATURATION: 98 % | SYSTOLIC BLOOD PRESSURE: 119 MMHG | RESPIRATION RATE: 18 BRPM | DIASTOLIC BLOOD PRESSURE: 69 MMHG | TEMPERATURE: 98 F | HEART RATE: 72 BPM

## 2018-05-12 LAB
ANION GAP SERPL CALC-SCNC: 13 MMOL/L — SIGNIFICANT CHANGE UP (ref 5–17)
BUN SERPL-MCNC: 19 MG/DL — SIGNIFICANT CHANGE UP (ref 7–23)
CALCIUM SERPL-MCNC: 9 MG/DL — SIGNIFICANT CHANGE UP (ref 8.4–10.5)
CHLORIDE SERPL-SCNC: 97 MMOL/L — SIGNIFICANT CHANGE UP (ref 96–108)
CO2 SERPL-SCNC: 26 MMOL/L — SIGNIFICANT CHANGE UP (ref 22–31)
CREAT SERPL-MCNC: 1.03 MG/DL — SIGNIFICANT CHANGE UP (ref 0.5–1.3)
GLUCOSE SERPL-MCNC: 106 MG/DL — HIGH (ref 70–99)
HCT VFR BLD CALC: 30.6 % — LOW (ref 39–50)
HCT VFR BLD CALC: 36.3 % — LOW (ref 39–50)
HGB BLD-MCNC: 10 G/DL — LOW (ref 13–17)
HGB BLD-MCNC: 11.9 G/DL — LOW (ref 13–17)
MAGNESIUM SERPL-MCNC: 1.7 MG/DL — SIGNIFICANT CHANGE UP (ref 1.6–2.6)
MCHC RBC-ENTMCNC: 27.4 PG — SIGNIFICANT CHANGE UP (ref 27–34)
MCHC RBC-ENTMCNC: 29.1 PG — SIGNIFICANT CHANGE UP (ref 27–34)
MCHC RBC-ENTMCNC: 32.7 GM/DL — SIGNIFICANT CHANGE UP (ref 32–36)
MCHC RBC-ENTMCNC: 32.7 GM/DL — SIGNIFICANT CHANGE UP (ref 32–36)
MCV RBC AUTO: 83.8 FL — SIGNIFICANT CHANGE UP (ref 80–100)
MCV RBC AUTO: 88.9 FL — SIGNIFICANT CHANGE UP (ref 80–100)
PHOSPHATE SERPL-MCNC: 3.3 MG/DL — SIGNIFICANT CHANGE UP (ref 2.5–4.5)
PLATELET # BLD AUTO: 404 K/UL — HIGH (ref 150–400)
PLATELET # BLD AUTO: 418 K/UL — HIGH (ref 150–400)
POTASSIUM SERPL-MCNC: 4.5 MMOL/L — SIGNIFICANT CHANGE UP (ref 3.5–5.3)
POTASSIUM SERPL-SCNC: 4.5 MMOL/L — SIGNIFICANT CHANGE UP (ref 3.5–5.3)
RBC # BLD: 3.65 M/UL — LOW (ref 4.2–5.8)
RBC # BLD: 4.09 M/UL — LOW (ref 4.2–5.8)
RBC # FLD: 14 % — SIGNIFICANT CHANGE UP (ref 10.3–14.5)
RBC # FLD: 16.1 % — HIGH (ref 10.3–14.5)
SODIUM SERPL-SCNC: 136 MMOL/L — SIGNIFICANT CHANGE UP (ref 135–145)
WBC # BLD: 13.91 K/UL — HIGH (ref 3.8–10.5)
WBC # BLD: 15.8 K/UL — HIGH (ref 3.8–10.5)
WBC # FLD AUTO: 13.91 K/UL — HIGH (ref 3.8–10.5)
WBC # FLD AUTO: 15.8 K/UL — HIGH (ref 3.8–10.5)

## 2018-05-12 PROCEDURE — C1781: CPT

## 2018-05-12 PROCEDURE — 86900 BLOOD TYPING SEROLOGIC ABO: CPT

## 2018-05-12 PROCEDURE — 86850 RBC ANTIBODY SCREEN: CPT

## 2018-05-12 PROCEDURE — 83735 ASSAY OF MAGNESIUM: CPT

## 2018-05-12 PROCEDURE — 86901 BLOOD TYPING SEROLOGIC RH(D): CPT

## 2018-05-12 PROCEDURE — 84100 ASSAY OF PHOSPHORUS: CPT

## 2018-05-12 PROCEDURE — 85027 COMPLETE CBC AUTOMATED: CPT

## 2018-05-12 PROCEDURE — 80048 BASIC METABOLIC PNL TOTAL CA: CPT

## 2018-05-12 PROCEDURE — 82962 GLUCOSE BLOOD TEST: CPT

## 2018-05-12 RX ORDER — DOCUSATE SODIUM 100 MG
100 CAPSULE ORAL THREE TIMES A DAY
Qty: 0 | Refills: 0 | Status: DISCONTINUED | OUTPATIENT
Start: 2018-05-12 | End: 2018-05-12

## 2018-05-12 RX ORDER — SERTRALINE 25 MG/1
100 TABLET, FILM COATED ORAL
Qty: 0 | Refills: 0 | Status: DISCONTINUED | OUTPATIENT
Start: 2018-05-12 | End: 2018-05-12

## 2018-05-12 RX ORDER — POLYETHYLENE GLYCOL 3350 17 G/17G
17 POWDER, FOR SOLUTION ORAL DAILY
Qty: 0 | Refills: 0 | Status: DISCONTINUED | OUTPATIENT
Start: 2018-05-12 | End: 2018-05-12

## 2018-05-12 RX ORDER — SIMETHICONE 80 MG/1
80 TABLET, CHEWABLE ORAL DAILY
Qty: 0 | Refills: 0 | Status: DISCONTINUED | OUTPATIENT
Start: 2018-05-12 | End: 2018-05-12

## 2018-05-12 RX ORDER — CLOPIDOGREL BISULFATE 75 MG/1
1 TABLET, FILM COATED ORAL
Qty: 0 | Refills: 0 | COMMUNITY

## 2018-05-12 RX ORDER — NICOTINE POLACRILEX 2 MG
1 GUM BUCCAL DAILY
Qty: 0 | Refills: 0 | Status: DISCONTINUED | OUTPATIENT
Start: 2018-05-12 | End: 2018-05-12

## 2018-05-12 RX ORDER — DIPHENHYDRAMINE HCL 50 MG
25 CAPSULE ORAL EVERY 4 HOURS
Qty: 0 | Refills: 0 | Status: DISCONTINUED | OUTPATIENT
Start: 2018-05-12 | End: 2018-05-12

## 2018-05-12 RX ORDER — MAGNESIUM SULFATE 500 MG/ML
1 VIAL (ML) INJECTION ONCE
Qty: 0 | Refills: 0 | Status: COMPLETED | OUTPATIENT
Start: 2018-05-12 | End: 2018-05-12

## 2018-05-12 RX ADMIN — OXYCODONE HYDROCHLORIDE 10 MILLIGRAM(S): 5 TABLET ORAL at 06:20

## 2018-05-12 RX ADMIN — Medication 650 MILLIGRAM(S): at 08:07

## 2018-05-12 RX ADMIN — ROPINIROLE 1 MILLIGRAM(S): 8 TABLET, FILM COATED, EXTENDED RELEASE ORAL at 05:20

## 2018-05-12 RX ADMIN — Medication 1 PATCH: at 11:52

## 2018-05-12 RX ADMIN — Medication 650 MILLIGRAM(S): at 07:37

## 2018-05-12 RX ADMIN — Medication 25 MILLIGRAM(S): at 10:48

## 2018-05-12 RX ADMIN — Medication 650 MILLIGRAM(S): at 00:30

## 2018-05-12 RX ADMIN — Medication 1 PATCH: at 15:59

## 2018-05-12 RX ADMIN — POLYETHYLENE GLYCOL 3350 17 GRAM(S): 17 POWDER, FOR SOLUTION ORAL at 13:28

## 2018-05-12 RX ADMIN — ROPINIROLE 1 MILLIGRAM(S): 8 TABLET, FILM COATED, EXTENDED RELEASE ORAL at 13:27

## 2018-05-12 RX ADMIN — SIMETHICONE 80 MILLIGRAM(S): 80 TABLET, CHEWABLE ORAL at 13:27

## 2018-05-12 RX ADMIN — Medication 0.5 MILLIGRAM(S): at 13:28

## 2018-05-12 RX ADMIN — OXYCODONE HYDROCHLORIDE 10 MILLIGRAM(S): 5 TABLET ORAL at 12:05

## 2018-05-12 RX ADMIN — Medication 100 MILLIGRAM(S): at 11:52

## 2018-05-12 RX ADMIN — Medication 100 GRAM(S): at 10:48

## 2018-05-12 RX ADMIN — SERTRALINE 100 MILLIGRAM(S): 25 TABLET, FILM COATED ORAL at 13:27

## 2018-05-12 RX ADMIN — OXYCODONE HYDROCHLORIDE 10 MILLIGRAM(S): 5 TABLET ORAL at 05:22

## 2018-05-12 RX ADMIN — OXYCODONE HYDROCHLORIDE 10 MILLIGRAM(S): 5 TABLET ORAL at 12:35

## 2018-05-12 NOTE — DISCHARGE NOTE ADULT - PLAN OF CARE
return to daily living activity prior to surgery, postoperative recovery Follow up with Dr. Barrera in 7 to 10 days.  Call [phone] for appointment.    No heavy lifting for 6 weeks. You can shower but no tub baths. Let water run over incision and pat dry.    You will be discharged with ROBERT drain. You will need to empty at least once daily and record the outputs accurately. This will be taught to you by the nursing staff.   Please do not remove the ROBERT drain.  Please empty and record output daily, and discard contents.  Please bring recorded results to your follow up appointment.  They will be removed in the office when clinically indicated.   Please bring to the office, the recorded results of the daily recorded output.    Notify your surgeon and return to ER for temperatures greater than 101, chills sweats, pain not controlled with pain medications, persistent nausea and vomiting, or acutely concerning matters to you, that may require urgent medical attention. Please follow up with your Primary Care Physician regarding your hospitalization, and schedule an appointment within two weeks after your discharge to review your hospital course.  Please  review all your current medications, and dose adjust as prescribed by your Primary Care Physician.  Call their office for appointment. Follow up with Dr. Barrera on Tuesday 5/15/18.  Call [phone] on Monday for appointment confirmation.    No heavy lifting for 6 weeks. You can shower but no tub baths. Let water run over incision and pat dry.    You will be discharged with ROBERT drain. You will need to empty at least once daily and record the outputs accurately. This will be taught to you by the nursing staff.   Please do not remove the ROBERT drain.  Please empty and record output daily, and discard contents.  Please bring recorded results to your follow up appointment.  They will be removed in the office when clinically indicated.   Please bring to the office, the recorded results of the daily recorded output.    Notify your surgeon and return to ER for temperatures greater than 101, chills sweats, pain not controlled with pain medications, persistent nausea and vomiting, or acutely concerning matters to you, that may require urgent medical attention. Follow up with Dr. Barrera on Tuesday 5/15/18.  Call [phone] on Monday for appointment confirmation. Do not take Plavix until you discuss resuming it at your follow up appointment.    No heavy lifting for 6 weeks. You can shower but no tub baths. Let water run over incision and pat dry.    You will be discharged with ROBERT drain. You will need to empty at least once daily and record the outputs accurately. This will be taught to you by the nursing staff.   Please do not remove the ROBERT drain.  Please empty and record output daily, and discard contents.  Please bring recorded results to your follow up appointment.  This will be removed in the office when clinically indicated.   Please bring to the office, the recorded results of the daily recorded output.    Notify your surgeon and return to ER for temperatures greater than 101, chills sweats, pain not controlled with pain medications, persistent nausea and vomiting, or acutely concerning matters to you, that may require urgent medical attention.

## 2018-05-12 NOTE — DISCHARGE NOTE ADULT - MEDICATION SUMMARY - MEDICATIONS TO TAKE
I will START or STAY ON the medications listed below when I get home from the hospital:    acetaminophen 325 mg oral tablet  -- 2 tab(s) by mouth every 6 hours, As Needed for mild pain  -- Indication: For PAin    ibuprofen 600 mg oral tablet  -- 1 tab(s) by mouth every 6 hours, As Needed for mild pain  -- Indication: For PAin    Percocet 5/325 oral tablet  -- 1-2 tab(s) by mouth every 6 hours, As Needed for pain. MDD:6 tabs  -- Caution federal law prohibits the transfer of this drug to any person other  than the person for whom it was prescribed.  May cause drowsiness.  Alcohol may intensify this effect.  Use care when operating dangerous machinery.  This prescription cannot be refilled.  This product contains acetaminophen.  Do not use  with any other product containing acetaminophen to prevent possible liver damage.  Using more of this medication than prescribed may cause serious breathing problems.    -- Indication: For Severe pain    tamsulosin 0.4 mg oral capsule  -- 1 cap(s) by mouth once a day  -- Indication: For Urinary retention    traZODone 100 mg oral tablet  -- 1 tab(s) by mouth once a day (at bedtime)  -- Indication: For Depression    sertraline 100 mg oral tablet  -- 2 tab(s) by mouth once a day  -- Indication: For Depression    rosuvastatin 20 mg oral tablet  -- 1 tab(s) by mouth once a day (at bedtime)  -- Indication: For Hyperlipidemia    rOPINIRole 1 mg oral tablet  -- 1 tab(s) by mouth 3 times a day  -- Indication: For Restless leg syndrome    ALPRAZolam 0.5 mg oral tablet  -- 1 tab(s) by mouth 3 times a day  -- Indication: For Anxiety

## 2018-05-12 NOTE — DISCHARGE NOTE ADULT - SECONDARY DIAGNOSIS.
PAD (peripheral artery disease) BPH (benign prostatic hyperplasia) Hyperlipidemia Restless leg syndrome Depression Anxiety

## 2018-05-12 NOTE — DISCHARGE NOTE ADULT - PATIENT PORTAL LINK FT
You can access the [facility] Patient Portal, offered by [facility], by registering with the following website: [email or web address]

## 2018-05-12 NOTE — PROGRESS NOTE ADULT - ASSESSMENT
62y Male POD#1 from incisional hernia repair with Ventrio ST mesh    PLAN:  - Pain management  - Advance diet  - Follow UOP  - F/u AM CBC  - Follow ROBERT output quality and quantity

## 2018-05-12 NOTE — DISCHARGE NOTE ADULT - HOSPITAL COURSE
Underwent incisional hernia repair with mesh on 5/11/18. Tolerated procedure well. Amezcua was removed on POD1 and patient voided appropriately. Patient is now tolerating regular diet, has GI function, is ambulating and voiding without out issues, and pain is controlled. He is ready for discharge in stable condition. He will follow up with Dr. Barrera in 1-2 weeks. He will be discharged with a ROBERT drain. Underwent incisional hernia repair with mesh on 5/11/18. Tolerated procedure well. Amezcua was removed on POD1 and patient voided appropriately. Patient is now tolerating regular diet, has GI function, is ambulating and voiding without out issues, and pain is controlled. He is ready for discharge in stable condition. He will follow up with Dr. Barrera within 1 week. He will be discharged with a ROBERT drain.

## 2018-05-12 NOTE — PROGRESS NOTE ADULT - ATTENDING COMMENTS
I saw and examined the patient with the team, and reviewed  the history and data with the patient and staff  Agree with note which was also reviewed and edited where appropriate.  D/W patient, RN, residents and Fellow    stable post op.  Will feed, and evaluate for pm d/c.

## 2018-05-12 NOTE — DISCHARGE NOTE ADULT - CARE PLAN
Principal Discharge DX:	Incisional hernia  Goal:	return to daily living activity prior to surgery, postoperative recovery  Assessment and plan of treatment:	Follow up with Dr. Barrera in 7 to 10 days.  Call [phone] for appointment.    No heavy lifting for 6 weeks. You can shower but no tub baths. Let water run over incision and pat dry.    You will be discharged with ROBERT drain. You will need to empty at least once daily and record the outputs accurately. This will be taught to you by the nursing staff.   Please do not remove the ROBERT drain.  Please empty and record output daily, and discard contents.  Please bring recorded results to your follow up appointment.  They will be removed in the office when clinically indicated.   Please bring to the office, the recorded results of the daily recorded output.    Notify your surgeon and return to ER for temperatures greater than 101, chills sweats, pain not controlled with pain medications, persistent nausea and vomiting, or acutely concerning matters to you, that may require urgent medical attention.  Secondary Diagnosis:	PAD (peripheral artery disease)  Assessment and plan of treatment:	Please follow up with your Primary Care Physician regarding your hospitalization, and schedule an appointment within two weeks after your discharge to review your hospital course.  Please  review all your current medications, and dose adjust as prescribed by your Primary Care Physician.  Call their office for appointment.  Secondary Diagnosis:	BPH (benign prostatic hyperplasia)  Secondary Diagnosis:	Hyperlipidemia  Secondary Diagnosis:	Restless leg syndrome  Secondary Diagnosis:	Depression  Secondary Diagnosis:	Anxiety Principal Discharge DX:	Incisional hernia  Goal:	return to daily living activity prior to surgery, postoperative recovery  Assessment and plan of treatment:	Follow up with Dr. Barrera on Tuesday 5/15/18.  Call [phone] on Monday for appointment confirmation.    No heavy lifting for 6 weeks. You can shower but no tub baths. Let water run over incision and pat dry.    You will be discharged with ROBERT drain. You will need to empty at least once daily and record the outputs accurately. This will be taught to you by the nursing staff.   Please do not remove the ROBERT drain.  Please empty and record output daily, and discard contents.  Please bring recorded results to your follow up appointment.  They will be removed in the office when clinically indicated.   Please bring to the office, the recorded results of the daily recorded output.    Notify your surgeon and return to ER for temperatures greater than 101, chills sweats, pain not controlled with pain medications, persistent nausea and vomiting, or acutely concerning matters to you, that may require urgent medical attention.  Secondary Diagnosis:	PAD (peripheral artery disease)  Assessment and plan of treatment:	Please follow up with your Primary Care Physician regarding your hospitalization, and schedule an appointment within two weeks after your discharge to review your hospital course.  Please  review all your current medications, and dose adjust as prescribed by your Primary Care Physician.  Call their office for appointment.  Secondary Diagnosis:	BPH (benign prostatic hyperplasia)  Secondary Diagnosis:	Hyperlipidemia  Secondary Diagnosis:	Restless leg syndrome  Secondary Diagnosis:	Depression  Secondary Diagnosis:	Anxiety Principal Discharge DX:	Incisional hernia  Goal:	return to daily living activity prior to surgery, postoperative recovery  Assessment and plan of treatment:	Follow up with Dr. Barrera on Tuesday 5/15/18.  Call [phone] on Monday for appointment confirmation. Do not take Plavix until you discuss resuming it at your follow up appointment.    No heavy lifting for 6 weeks. You can shower but no tub baths. Let water run over incision and pat dry.    You will be discharged with ROBERT drain. You will need to empty at least once daily and record the outputs accurately. This will be taught to you by the nursing staff.   Please do not remove the ROBERT drain.  Please empty and record output daily, and discard contents.  Please bring recorded results to your follow up appointment.  This will be removed in the office when clinically indicated.   Please bring to the office, the recorded results of the daily recorded output.    Notify your surgeon and return to ER for temperatures greater than 101, chills sweats, pain not controlled with pain medications, persistent nausea and vomiting, or acutely concerning matters to you, that may require urgent medical attention.  Secondary Diagnosis:	PAD (peripheral artery disease)  Assessment and plan of treatment:	Please follow up with your Primary Care Physician regarding your hospitalization, and schedule an appointment within two weeks after your discharge to review your hospital course.  Please  review all your current medications, and dose adjust as prescribed by your Primary Care Physician.  Call their office for appointment.  Secondary Diagnosis:	BPH (benign prostatic hyperplasia)  Secondary Diagnosis:	Hyperlipidemia  Secondary Diagnosis:	Restless leg syndrome  Secondary Diagnosis:	Depression  Secondary Diagnosis:	Anxiety

## 2018-05-12 NOTE — PROGRESS NOTE ADULT - SUBJECTIVE AND OBJECTIVE BOX
Green Team Surgery Progress Note - pager [phone]      SUBJECTIVE: Patient seen and examined on morning rounds. Had some oozing from incision at post op check. On repeat CBC hct 36 from 39. Vital signs stable. Pain is controlled. Has been NPO.      OBJECTIVE:     ** Vital signs / I&O's **    Vital Signs Last 24 Hrs  T(C): 36.4 (12 May 2018 05:10), Max: 37.1 (11 May 2018 13:40)  T(F): 97.5 (12 May 2018 05:10), Max: 98.8 (11 May 2018 13:40)  HR: 79 (12 May 2018 05:10) (72 - 95)  BP: 110/71 (12 May 2018 05:10) (110/71 - 176/83)  BP(mean): 109 (11 May 2018 18:30) (94 - 117)  RR: 18 (12 May 2018 05:10) (14 - 20)  SpO2: 95% (12 May 2018 05:10) (93% - 100%)      11 May 2018 07:01  -  12 May 2018 05:46  --------------------------------------------------------  IN:    lactated ringers.: 75 mL    lactated ringers.: 500 mL    Oral Fluid: 120 mL  Total IN: 695 mL    OUT:    Bulb: 70 mL    Indwelling Catheter - Urethral: 805 mL  Total OUT: 875 mL    Total NET: -180 mL          ** Physical Exam **  Gen: Alert, NAD  Abd: Abdomen: soft, nontender, nondistended, midline incision without active bleeding, dmitriy in place, RLQ drain with serosanguinous (sanguinous > serous) output     ** Labs **                          11.9   15.8  )-----------( 404      ( 12 May 2018 00:35 )             36.3     CAPILLARY BLOOD GLUCOSE      POCT Blood Glucose.: 108 mg/dL (11 May 2018 08:33)      MEDICATIONS  (STANDING):  ALPRAZolam 0.5 milliGRAM(s) Oral three times a day  atorvastatin 80 milliGRAM(s) Oral at bedtime  ceFAZolin   IVPB 2000 milliGRAM(s) IV Intermittent once  enoxaparin Injectable 40 milliGRAM(s) SubCutaneous every 24 hours  lactated ringers. 1000 milliLiter(s) (125 mL/Hr) IV Continuous <Continuous>  nicotine - 21 mG/24Hr(s) Patch 1 patch Transdermal daily  rOPINIRole 1 milliGRAM(s) Oral three times a day  sertraline 200 milliGRAM(s) Oral daily  traZODone 100 milliGRAM(s) Oral at bedtime    MEDICATIONS  (PRN):  acetaminophen   Tablet. 650 milliGRAM(s) Oral every 6 hours PRN Mild Pain (1 - 3)  oxyCODONE    IR 10 milliGRAM(s) Oral every 6 hours PRN Severe Pain (7 - 10)  oxyCODONE    IR 5 milliGRAM(s) Oral every 4 hours PRN Moderate Pain (4 - 6)

## 2018-05-12 NOTE — DISCHARGE NOTE ADULT - CARE PROVIDER_API CALL
Devon Barrera (MD), Surgery  [address] [facility]  [address] [place] [id number]  Phone: [phone]  Fax: [phone]

## 2018-05-15 ENCOUNTER — APPOINTMENT (OUTPATIENT)
Dept: SURGERY | Facility: CLINIC | Age: 63
End: 2018-05-15
Payer: COMMERCIAL

## 2018-05-15 VITALS
DIASTOLIC BLOOD PRESSURE: 70 MMHG | SYSTOLIC BLOOD PRESSURE: 119 MMHG | BODY MASS INDEX: 38.09 KG/M2 | TEMPERATURE: 97.7 F | HEIGHT: 62 IN | HEART RATE: 90 BPM | OXYGEN SATURATION: 98 % | WEIGHT: 207 LBS

## 2018-05-15 PROCEDURE — 99024 POSTOP FOLLOW-UP VISIT: CPT

## 2018-05-21 ENCOUNTER — FORM ENCOUNTER (OUTPATIENT)
Age: 63
End: 2018-05-21

## 2018-05-22 ENCOUNTER — OUTPATIENT (OUTPATIENT)
Dept: OUTPATIENT SERVICES | Facility: HOSPITAL | Age: 63
LOS: 1 days | End: 2018-05-22
Payer: COMMERCIAL

## 2018-05-22 ENCOUNTER — APPOINTMENT (OUTPATIENT)
Dept: ULTRASOUND IMAGING | Facility: CLINIC | Age: 63
End: 2018-05-22
Payer: COMMERCIAL

## 2018-05-22 ENCOUNTER — APPOINTMENT (OUTPATIENT)
Dept: SURGERY | Facility: CLINIC | Age: 63
End: 2018-05-22
Payer: COMMERCIAL

## 2018-05-22 VITALS
HEART RATE: 82 BPM | DIASTOLIC BLOOD PRESSURE: 79 MMHG | HEIGHT: 62 IN | OXYGEN SATURATION: 100 % | SYSTOLIC BLOOD PRESSURE: 121 MMHG | TEMPERATURE: 97.8 F | WEIGHT: 207 LBS | RESPIRATION RATE: 16 BRPM | BODY MASS INDEX: 38.09 KG/M2

## 2018-05-22 DIAGNOSIS — Z98.890 OTHER SPECIFIED POSTPROCEDURAL STATES: Chronic | ICD-10-CM

## 2018-05-22 DIAGNOSIS — Z00.8 ENCOUNTER FOR OTHER GENERAL EXAMINATION: ICD-10-CM

## 2018-05-22 DIAGNOSIS — K43.2 INCISIONAL HERNIA W/OUT OBSTRUCTION OR GANGRENE: ICD-10-CM

## 2018-05-22 DIAGNOSIS — Z90.49 ACQUIRED ABSENCE OF OTHER SPECIFIED PARTS OF DIGESTIVE TRACT: Chronic | ICD-10-CM

## 2018-05-22 DIAGNOSIS — I73.9 PERIPHERAL VASCULAR DISEASE, UNSPECIFIED: Chronic | ICD-10-CM

## 2018-05-22 PROCEDURE — 76705 ECHO EXAM OF ABDOMEN: CPT | Mod: 26

## 2018-05-22 PROCEDURE — 76705 ECHO EXAM OF ABDOMEN: CPT

## 2018-05-22 PROCEDURE — 99024 POSTOP FOLLOW-UP VISIT: CPT

## 2018-05-23 ENCOUNTER — APPOINTMENT (OUTPATIENT)
Dept: SURGERY | Facility: CLINIC | Age: 63
End: 2018-05-23
Payer: COMMERCIAL

## 2018-05-31 ENCOUNTER — APPOINTMENT (OUTPATIENT)
Dept: SURGERY | Facility: CLINIC | Age: 63
End: 2018-05-31
Payer: COMMERCIAL

## 2018-05-31 VITALS
TEMPERATURE: 98 F | DIASTOLIC BLOOD PRESSURE: 75 MMHG | RESPIRATION RATE: 15 BRPM | OXYGEN SATURATION: 98 % | SYSTOLIC BLOOD PRESSURE: 122 MMHG | HEART RATE: 78 BPM

## 2018-05-31 DIAGNOSIS — Z09 ENCOUNTER FOR FOLLOW-UP EXAMINATION AFTER COMPLETED TREATMENT FOR CONDITIONS OTHER THAN MALIGNANT NEOPLASM: ICD-10-CM

## 2018-05-31 DIAGNOSIS — K63.89 OTHER SPECIFIED DISEASES OF INTESTINE: ICD-10-CM

## 2018-05-31 PROCEDURE — 99024 POSTOP FOLLOW-UP VISIT: CPT

## 2018-05-31 RX ORDER — TAMSULOSIN HYDROCHLORIDE 0.4 MG/1
0.4 CAPSULE ORAL
Qty: 7 | Refills: 0 | Status: DISCONTINUED | COMMUNITY
Start: 2018-04-28 | End: 2018-05-31

## 2018-05-31 RX ORDER — MUPIROCIN 20 MG/G
2 OINTMENT TOPICAL
Qty: 15 | Refills: 0 | Status: DISCONTINUED | COMMUNITY
Start: 2018-05-08 | End: 2018-05-31

## 2018-06-07 ENCOUNTER — APPOINTMENT (OUTPATIENT)
Dept: SURGERY | Facility: CLINIC | Age: 63
End: 2018-06-07
Payer: COMMERCIAL

## 2018-06-07 VITALS
HEIGHT: 74 IN | DIASTOLIC BLOOD PRESSURE: 72 MMHG | TEMPERATURE: 98 F | OXYGEN SATURATION: 98 % | SYSTOLIC BLOOD PRESSURE: 122 MMHG | BODY MASS INDEX: 26.56 KG/M2 | HEART RATE: 80 BPM | WEIGHT: 207 LBS | RESPIRATION RATE: 15 BRPM

## 2018-06-07 PROCEDURE — 99024 POSTOP FOLLOW-UP VISIT: CPT

## 2018-07-12 ENCOUNTER — APPOINTMENT (OUTPATIENT)
Dept: SURGERY | Facility: CLINIC | Age: 63
End: 2018-07-12

## 2018-07-12 VITALS
DIASTOLIC BLOOD PRESSURE: 66 MMHG | RESPIRATION RATE: 16 BRPM | SYSTOLIC BLOOD PRESSURE: 117 MMHG | HEART RATE: 90 BPM | TEMPERATURE: 98.6 F | OXYGEN SATURATION: 98 %

## 2018-07-12 DIAGNOSIS — Z98.890 OTHER SPECIFIED POSTPROCEDURAL STATES: ICD-10-CM

## 2018-07-12 RX ORDER — OXYCODONE AND ACETAMINOPHEN 5; 325 MG/1; MG/1
5-325 TABLET ORAL
Qty: 24 | Refills: 0 | Status: DISCONTINUED | COMMUNITY
Start: 2018-05-23 | End: 2018-07-12

## 2018-07-17 PROBLEM — R73.03 PREDIABETES: Chronic | Status: ACTIVE | Noted: 2018-04-17

## 2018-07-17 PROBLEM — Z87.19 PERSONAL HISTORY OF OTHER DISEASES OF THE DIGESTIVE SYSTEM: Chronic | Status: ACTIVE | Noted: 2018-04-17

## 2018-07-17 PROBLEM — K57.92 DIVERTICULITIS OF INTESTINE, PART UNSPECIFIED, WITHOUT PERFORATION OR ABSCESS WITHOUT BLEEDING: Chronic | Status: ACTIVE | Noted: 2018-04-17

## 2018-07-24 PROBLEM — F32.9 MAJOR DEPRESSIVE DISORDER, SINGLE EPISODE, UNSPECIFIED: Chronic | Status: ACTIVE | Noted: 2018-04-17

## 2018-07-24 PROBLEM — F41.9 ANXIETY DISORDER, UNSPECIFIED: Chronic | Status: ACTIVE | Noted: 2018-04-17

## 2018-07-24 PROBLEM — F17.200 NICOTINE DEPENDENCE, UNSPECIFIED, UNCOMPLICATED: Chronic | Status: ACTIVE | Noted: 2018-04-17

## 2018-07-24 PROBLEM — N40.0 BENIGN PROSTATIC HYPERPLASIA WITHOUT LOWER URINARY TRACT SYMPTOMS: Chronic | Status: ACTIVE | Noted: 2018-04-17

## 2018-07-24 PROBLEM — G25.81 RESTLESS LEGS SYNDROME: Chronic | Status: ACTIVE | Noted: 2018-04-17

## 2018-07-24 PROBLEM — E78.5 HYPERLIPIDEMIA, UNSPECIFIED: Chronic | Status: ACTIVE | Noted: 2018-04-17

## 2018-07-24 PROBLEM — I73.9 PERIPHERAL VASCULAR DISEASE, UNSPECIFIED: Chronic | Status: ACTIVE | Noted: 2018-04-17

## 2018-07-30 ENCOUNTER — FORM ENCOUNTER (OUTPATIENT)
Age: 63
End: 2018-07-30

## 2018-07-31 ENCOUNTER — OUTPATIENT (OUTPATIENT)
Dept: OUTPATIENT SERVICES | Facility: HOSPITAL | Age: 63
LOS: 1 days | End: 2018-07-31
Payer: COMMERCIAL

## 2018-07-31 ENCOUNTER — APPOINTMENT (OUTPATIENT)
Dept: CT IMAGING | Facility: CLINIC | Age: 63
End: 2018-07-31

## 2018-07-31 DIAGNOSIS — Z98.890 OTHER SPECIFIED POSTPROCEDURAL STATES: Chronic | ICD-10-CM

## 2018-07-31 DIAGNOSIS — I73.9 PERIPHERAL VASCULAR DISEASE, UNSPECIFIED: Chronic | ICD-10-CM

## 2018-07-31 DIAGNOSIS — Z00.8 ENCOUNTER FOR OTHER GENERAL EXAMINATION: ICD-10-CM

## 2018-07-31 DIAGNOSIS — R19.00 INTRA-ABDOMINAL AND PELVIC SWELLING, MASS AND LUMP, UNSPECIFIED SITE: ICD-10-CM

## 2018-07-31 DIAGNOSIS — Z90.49 ACQUIRED ABSENCE OF OTHER SPECIFIED PARTS OF DIGESTIVE TRACT: Chronic | ICD-10-CM

## 2018-07-31 PROCEDURE — 74176 CT ABD & PELVIS W/O CONTRAST: CPT | Mod: 26

## 2018-07-31 PROCEDURE — 74176 CT ABD & PELVIS W/O CONTRAST: CPT

## 2018-12-13 NOTE — H&P PST ADULT - CIGARETTES, PACKS/DAY
LOV 6-27-18  Pt was to f/u in 6-8wks, and did not  30 supply given with not, pt needs bp appt with provider prior to refill  Please call pt to schedule a bp check with Dr Rojo.   0.5

## 2019-04-02 NOTE — BRIEF OPERATIVE NOTE - PROCEDURE POST
Last time it improved with elevation overnight. If elevation is not improving then he should start clindamycin.    He has venous stasis that causes dependent edema quickly when his leg is not elevated.  This does not need treatment.    If needed we could fit him on Thursday morning    Quentin Stewart MD     <<-----Click on this checkbox to enter Post-Op Dx

## 2019-09-05 ENCOUNTER — APPOINTMENT (OUTPATIENT)
Dept: CT IMAGING | Facility: CLINIC | Age: 64
End: 2019-09-05
Payer: COMMERCIAL

## 2019-09-05 ENCOUNTER — OUTPATIENT (OUTPATIENT)
Dept: OUTPATIENT SERVICES | Facility: HOSPITAL | Age: 64
LOS: 1 days | End: 2019-09-05
Payer: COMMERCIAL

## 2019-09-05 DIAGNOSIS — Z90.49 ACQUIRED ABSENCE OF OTHER SPECIFIED PARTS OF DIGESTIVE TRACT: Chronic | ICD-10-CM

## 2019-09-05 DIAGNOSIS — Z98.890 OTHER SPECIFIED POSTPROCEDURAL STATES: Chronic | ICD-10-CM

## 2019-09-05 DIAGNOSIS — I73.9 PERIPHERAL VASCULAR DISEASE, UNSPECIFIED: Chronic | ICD-10-CM

## 2019-09-05 DIAGNOSIS — Z00.8 ENCOUNTER FOR OTHER GENERAL EXAMINATION: ICD-10-CM

## 2019-09-05 PROCEDURE — 74177 CT ABD & PELVIS W/CONTRAST: CPT | Mod: 26

## 2019-09-05 PROCEDURE — 74177 CT ABD & PELVIS W/CONTRAST: CPT

## 2019-09-05 PROCEDURE — 82565 ASSAY OF CREATININE: CPT

## 2019-09-06 NOTE — H&P PST ADULT - ATTEMPT TO OOB
Discharge Planner PT   Patient plan for discharge: Home with fiance available to provide 24hr assist through ~Tuesday next week, then planning to have neighbor provide assist as needed    Current status: PT orders received, eval completed, treatment initiated. Pt is a 53yo male seen POD#1 s/p hardware removal and PSF L2-L4. Pt lives with fiance in a house with 5 steps L rail to enter, flight with L rail up to bed/bath with tub/shower. Pt reports mobility limited by pain prior to surgery but was IND without AD. 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     Pt reports L/R anterior thigh numbness still there but improving slowly. Educated on spine precautions, log roll, handout given. Pt transfers supine>sit with modA of 1 and cues for technique, very slow moving with bed mobility. Pt transfers sit<>stand to FWW with modA of 1. Pt ambulated 30' with FWW and CGA. Second assist present throughout for safety. VSS on RA. Pt denies adverse symptoms except pain 7/10. Pt up in chair for breakfast with chair alarm on, RN updated.    Barriers to return to prior living situation: Pain, level of assist, stairs - to be assessed    Recommendations for discharge: Home with assist on stairs    Rationale for recommendations: Pt moving fairly well on POD1 despite reporting higher pain levels, anticipate with continued IP PT that pt will progress to safely disch home with fiance, assist on stairs, may need AD - will continue to monitor.       Entered by: Luana Fernandez 09/06/2019 9:51 AM        no

## 2020-09-02 ENCOUNTER — TRANSCRIPTION ENCOUNTER (OUTPATIENT)
Age: 65
End: 2020-09-02

## 2020-09-02 ENCOUNTER — OUTPATIENT (OUTPATIENT)
Dept: OUTPATIENT SERVICES | Facility: HOSPITAL | Age: 65
LOS: 1 days | End: 2020-09-02
Payer: COMMERCIAL

## 2020-09-02 ENCOUNTER — APPOINTMENT (OUTPATIENT)
Dept: CT IMAGING | Facility: CLINIC | Age: 65
End: 2020-09-02
Payer: COMMERCIAL

## 2020-09-02 DIAGNOSIS — Z98.890 OTHER SPECIFIED POSTPROCEDURAL STATES: Chronic | ICD-10-CM

## 2020-09-02 DIAGNOSIS — K21.9 GASTRO-ESOPHAGEAL REFLUX DISEASE WITHOUT ESOPHAGITIS: ICD-10-CM

## 2020-09-02 DIAGNOSIS — Z90.49 ACQUIRED ABSENCE OF OTHER SPECIFIED PARTS OF DIGESTIVE TRACT: Chronic | ICD-10-CM

## 2020-09-02 DIAGNOSIS — Z00.8 ENCOUNTER FOR OTHER GENERAL EXAMINATION: ICD-10-CM

## 2020-09-02 DIAGNOSIS — I73.9 PERIPHERAL VASCULAR DISEASE, UNSPECIFIED: Chronic | ICD-10-CM

## 2020-09-02 PROCEDURE — 74177 CT ABD & PELVIS W/CONTRAST: CPT | Mod: 26

## 2020-09-02 PROCEDURE — 74177 CT ABD & PELVIS W/CONTRAST: CPT

## 2020-09-18 ENCOUNTER — TRANSCRIPTION ENCOUNTER (OUTPATIENT)
Age: 65
End: 2020-09-18

## 2020-11-14 ENCOUNTER — TRANSCRIPTION ENCOUNTER (OUTPATIENT)
Age: 65
End: 2020-11-14

## 2020-11-17 ENCOUNTER — TRANSCRIPTION ENCOUNTER (OUTPATIENT)
Age: 65
End: 2020-11-17

## 2020-11-23 ENCOUNTER — TRANSCRIPTION ENCOUNTER (OUTPATIENT)
Age: 65
End: 2020-11-23

## 2021-01-04 ENCOUNTER — APPOINTMENT (OUTPATIENT)
Dept: VASCULAR SURGERY | Facility: CLINIC | Age: 66
End: 2021-01-04

## 2021-02-12 ENCOUNTER — TRANSCRIPTION ENCOUNTER (OUTPATIENT)
Age: 66
End: 2021-02-12

## 2021-04-26 ENCOUNTER — TRANSCRIPTION ENCOUNTER (OUTPATIENT)
Age: 66
End: 2021-04-26

## 2021-06-01 ENCOUNTER — OUTPATIENT (OUTPATIENT)
Dept: OUTPATIENT SERVICES | Facility: HOSPITAL | Age: 66
LOS: 1 days | End: 2021-06-01
Payer: COMMERCIAL

## 2021-06-01 ENCOUNTER — APPOINTMENT (OUTPATIENT)
Dept: ANESTHESIOLOGY | Facility: CLINIC | Age: 66
End: 2021-06-01

## 2021-06-01 DIAGNOSIS — I73.9 PERIPHERAL VASCULAR DISEASE, UNSPECIFIED: Chronic | ICD-10-CM

## 2021-06-01 DIAGNOSIS — M54.16 RADICULOPATHY, LUMBAR REGION: ICD-10-CM

## 2021-06-01 DIAGNOSIS — Z98.890 OTHER SPECIFIED POSTPROCEDURAL STATES: Chronic | ICD-10-CM

## 2021-06-01 DIAGNOSIS — Z90.49 ACQUIRED ABSENCE OF OTHER SPECIFIED PARTS OF DIGESTIVE TRACT: Chronic | ICD-10-CM

## 2021-06-01 PROCEDURE — 64483 NJX AA&/STRD TFRM EPI L/S 1: CPT

## 2021-08-09 ENCOUNTER — APPOINTMENT (OUTPATIENT)
Dept: PULMONOLOGY | Facility: CLINIC | Age: 66
End: 2021-08-09

## 2021-09-07 DIAGNOSIS — Z01.812 ENCOUNTER FOR PREPROCEDURAL LABORATORY EXAMINATION: ICD-10-CM

## 2021-09-15 ENCOUNTER — APPOINTMENT (OUTPATIENT)
Dept: PULMONOLOGY | Facility: CLINIC | Age: 66
End: 2021-09-15

## 2021-11-30 ENCOUNTER — TRANSCRIPTION ENCOUNTER (OUTPATIENT)
Age: 66
End: 2021-11-30

## 2021-12-01 ENCOUNTER — APPOINTMENT (OUTPATIENT)
Dept: PULMONOLOGY | Facility: CLINIC | Age: 66
End: 2021-12-01
Payer: MEDICARE

## 2021-12-01 ENCOUNTER — NON-APPOINTMENT (OUTPATIENT)
Age: 66
End: 2021-12-01

## 2021-12-01 VITALS
TEMPERATURE: 97.6 F | HEIGHT: 74 IN | OXYGEN SATURATION: 98 % | DIASTOLIC BLOOD PRESSURE: 80 MMHG | RESPIRATION RATE: 16 BRPM | SYSTOLIC BLOOD PRESSURE: 143 MMHG | BODY MASS INDEX: 28.36 KG/M2 | HEART RATE: 91 BPM | WEIGHT: 221 LBS

## 2021-12-01 DIAGNOSIS — R06.02 SHORTNESS OF BREATH: ICD-10-CM

## 2021-12-01 DIAGNOSIS — Z87.891 PERSONAL HISTORY OF NICOTINE DEPENDENCE: ICD-10-CM

## 2021-12-01 DIAGNOSIS — R06.83 SNORING: ICD-10-CM

## 2021-12-01 PROCEDURE — 94727 GAS DIL/WSHOT DETER LNG VOL: CPT

## 2021-12-01 PROCEDURE — 71046 X-RAY EXAM CHEST 2 VIEWS: CPT | Mod: 26

## 2021-12-01 PROCEDURE — 94010 BREATHING CAPACITY TEST: CPT

## 2021-12-01 PROCEDURE — 94729 DIFFUSING CAPACITY: CPT

## 2021-12-01 PROCEDURE — 99204 OFFICE O/P NEW MOD 45 MIN: CPT | Mod: 25

## 2021-12-01 PROCEDURE — ZZZZZ: CPT

## 2021-12-01 PROCEDURE — 94618 PULMONARY STRESS TESTING: CPT

## 2021-12-01 RX ORDER — METOPROLOL TARTRATE 25 MG/1
25 TABLET, FILM COATED ORAL
Refills: 0 | Status: ACTIVE | COMMUNITY

## 2021-12-01 RX ORDER — PSYLLIUM SEED (WITH DEXTROSE)
POWDER (GRAM) ORAL
Refills: 0 | Status: DISCONTINUED | COMMUNITY
End: 2021-12-01

## 2021-12-01 RX ORDER — ROPINIROLE 1 MG/1
1 TABLET, FILM COATED ORAL
Refills: 0 | Status: DISCONTINUED | COMMUNITY
End: 2021-12-01

## 2021-12-01 RX ORDER — EZETIMIBE 10 MG/1
10 TABLET ORAL
Refills: 0 | Status: ACTIVE | COMMUNITY

## 2021-12-01 RX ORDER — ERGOCALCIFEROL 1.25 MG/1
1.25 MG CAPSULE, LIQUID FILLED ORAL
Qty: 4 | Refills: 0 | Status: DISCONTINUED | COMMUNITY
Start: 2017-11-24 | End: 2021-12-01

## 2021-12-01 RX ORDER — DEXLANSOPRAZOLE 60 MG/1
60 CAPSULE, DELAYED RELEASE ORAL
Refills: 0 | Status: ACTIVE | COMMUNITY

## 2021-12-01 RX ORDER — RIVAROXABAN 2.5 MG/1
TABLET, FILM COATED ORAL
Refills: 0 | Status: ACTIVE | COMMUNITY

## 2021-12-01 RX ORDER — TRAZODONE HYDROCHLORIDE 100 MG/1
100 TABLET ORAL
Refills: 0 | Status: DISCONTINUED | COMMUNITY
End: 2021-12-01

## 2021-12-01 RX ORDER — RIFAXIMIN 550 MG/1
550 TABLET ORAL 3 TIMES DAILY
Qty: 30 | Refills: 0 | Status: DISCONTINUED | COMMUNITY
Start: 2018-05-31 | End: 2021-12-01

## 2021-12-01 RX ORDER — LORATADINE 10 MG
TABLET,DISINTEGRATING ORAL
Refills: 0 | Status: DISCONTINUED | COMMUNITY
End: 2021-12-01

## 2021-12-01 NOTE — REASON FOR VISIT
[Initial] : an initial visit [Sleep Evaluation] : sleep evaluation [COPD] : COPD [Cough] : cough [Shortness of Breath] : shortness of breath [Spouse] : spouse

## 2021-12-01 NOTE — HISTORY OF PRESENT ILLNESS
[Former] : former [>= 30 pack years] : >= 30 pack years [TextBox_4] : Mr. Holley is a 66 year old, former 34 pack year smoking, male.  He has past medical history of hernia, colon resection, cervical disc surgery, leg vessal angioplasty x 5 (on Xarelto), rib fractures s/p fall, HTN, HLD, GERD, and fatigue. He presents for an initial visit. He is accompanied by Leah, his life partner. \par \par His chief concern is evaluating to see if he has emphysema. \par \par Patient admits to SOB when laying down and on exertion.  Patient can tolerate playing pickle ball for 2 hrs about 3-4x's times per week. He does feel he gets out of breath on exertion sooner than he should.  He notes he started to see his breathing decline about 6 months ago. \par \par His partner admit s to witnessed apneas, nighttime wheezing, and loud snoring. Patient admits to poor sleep x 10 years & states he sleeps an average of 3 hrs.  He states the broken and short sleep pattern is rt/ pain d/t sciatica.  He states he has RLS. He admits to suffering from fatigue and EDS. \par \par Patient's PCP is Dr. Read.\par \par He denies fever/chills, decreased appetite, SOB @ rest, wheezing, chest tightness or any other issues at this time. \par \par  [TextBox_11] : .75 [TextBox_13] : 45 [YearQuit] : 2021

## 2021-12-01 NOTE — ASSESSMENT
[FreeTextEntry1] : Mr. Holley is a 66 year old, former 34 pack year smoking, male.  He has past medical history of hernia, colon resection, cervical disc surgery, leg vessal angioplasty x 5 (on Xarelto), rib fractures s/p fall, GERD, HTN, HLD, and fatigue. He presents for an initial visit. He is accompanied by Leah, his life partner. His chief concern is evaluating to see if he has emphysema. \par \par 1. COPD:\par - Based on recent PFTs.\par - CXR in office WNL; Lung CA Screening Chest CT RX'ed.\par - Add Albuterol 2 puffs Q4-6H PRN and pre-exertion.\par - Add Breztri 2 puffs BID (rinse and gargle post use - 14 day sample provided). \par \par 2. Former Smoker:\par - Chest CT (Lung CA Screening) for further eval.\par \par 3. GERD:\par - Continue Dexilant & Omeprazole as RX'ed by GI.\par \par 4. Snoring:\par - [employer] HST RX'ed to evaluate for ROLLY. \par \par Patient to follow up with Dr. Keys as scheduled.\par Patient to call with further questions and concerns.\par Patient verbalizes understanding of care plan and is agreeable.\par \par

## 2021-12-01 NOTE — REVIEW OF SYSTEMS
[Fatigue] : fatigue [EDS] : eds [Cough] : cough [SOB on Exertion] : sob on exertion [Negative] : Endocrine [Chest Tightness] : no chest tightness [Dyspnea] : no dyspnea [Wheezing] : no wheezing

## 2021-12-01 NOTE — PROCEDURE
[FreeTextEntry1] : PFT's performed in office show minimal obstructive lung defect. Lung volume and diffusion capacity WNL. \par FEV1: 79% \par FEV1/FVC Ratio: 71% \par FUZ73-50%: 56% \par DLCO: 78% \par \par 6MWT Performed in office WNL.\par RA SPO2 @ REST: 98% \par RA SPO2 @ EXERTION:97% \par \par CXR in office; Read by Dr. Keys. \par Impression: Normal appearing heart. No infiltrates, effusion or opacities noted. Mamillated diaphragm appreciated. \par \par

## 2021-12-13 ENCOUNTER — NON-APPOINTMENT (OUTPATIENT)
Age: 66
End: 2021-12-13

## 2021-12-13 RX ORDER — BUDESONIDE, GLYCOPYRROLATE, AND FORMOTEROL FUMARATE 160; 9; 4.8 UG/1; UG/1; UG/1
160-9-4.8 AEROSOL, METERED RESPIRATORY (INHALATION)
Qty: 1 | Refills: 1 | Status: ACTIVE | COMMUNITY
Start: 2021-12-13 | End: 1900-01-01

## 2021-12-27 ENCOUNTER — TRANSCRIPTION ENCOUNTER (OUTPATIENT)
Age: 66
End: 2021-12-27

## 2022-01-12 ENCOUNTER — NON-APPOINTMENT (OUTPATIENT)
Age: 67
End: 2022-01-12

## 2022-02-04 ENCOUNTER — APPOINTMENT (OUTPATIENT)
Dept: PULMONOLOGY | Facility: CLINIC | Age: 67
End: 2022-02-04
Payer: MEDICARE

## 2022-02-04 VITALS — BODY MASS INDEX: 27.59 KG/M2 | WEIGHT: 215 LBS | HEIGHT: 74 IN

## 2022-02-04 DIAGNOSIS — G47.33 OBSTRUCTIVE SLEEP APNEA (ADULT) (PEDIATRIC): ICD-10-CM

## 2022-02-04 DIAGNOSIS — J44.9 CHRONIC OBSTRUCTIVE PULMONARY DISEASE, UNSPECIFIED: ICD-10-CM

## 2022-02-04 PROCEDURE — 99213 OFFICE O/P EST LOW 20 MIN: CPT | Mod: 95

## 2022-02-04 NOTE — REASON FOR VISIT
[Home] : at home, [unfilled] , at the time of the visit. [Medical Office: ([facility] [place] [facility])___] : at the medical office located in  [Verbal consent obtained from patient] : the patient, [unfilled] [Follow-Up] : a follow-up visit [Sleep Apnea] : sleep apnea

## 2022-02-04 NOTE — DISCUSSION/SUMMARY
[FreeTextEntry1] : 1/31/22 [facility] HST c/w mild to moderate ROLLY. Night 1: RDI of 13/hr and Night 2: RDI of 15.6/hr. \par \par Discussed treatment options of OA. Explained Oral appliances are safe, noninvasive and highly effective for treating snoring and varying severities of obstructive sleep apnea. The oral devices are designed to position the lower jaw slightly forward and down. This opens the airway. These devices are simple, portable, and silent, but they can be almost as expensive as CPAP. This option is not as suitable with those that have moderate to severe sleep apnea as well as central or complex sleep apnea. I did discuss the need for a follow up sleep study once the oral appliance was made and fit for comfort to actively assess how well it is treating the condition. \par \par Discussed trialing CPAP w/ 1 night titration study.  Patient feels he will struggle to fall asleep in foreign environment. \par \par \par

## 2022-02-04 NOTE — HISTORY OF PRESENT ILLNESS
[Former] : former [>= 30 pack years] : >= 30 pack years [TextBox_4] : Mr. Holley is a 66 year old, former 34 pack year smoking, male.  He has past medical history of hernia, colon resection, cervical disc surgery, leg vessal angioplasty x 5 (on Xarelto), rib fractures s/p fall, HTN, HLD, GERD, and fatigue. He presents for a follow up visit, via video. \par \par His chief concern is discussing recent HST results.\par \par 12/1/21 NPA VISIT:\par His chief concern is evaluating to see if he has emphysema. \par Patient admits to SOB when laying down and on exertion.  Patient can tolerate playing pickle ball for 2 hrs about 3-4x's times per week. He does feel he gets out of breath on exertion sooner than he should.  He notes he started to see his breathing decline about 6 months ago. \par \par His partner admit s to witnessed apneas, nighttime wheezing, and loud snoring. Patient admits to poor sleep x 10 years & states he sleeps an average of 3 hrs.  He states the broken and short sleep pattern is rt/ pain d/t sciatica.  He states he has RLS. He admits to suffering from fatigue and EDS. \par \par Patient's PCP is Dr. Read.\par \par 2/4/22 UPDATE:\par \par Patient presents for above sleep symptoms. \par Patient was originally RX'ed [facility] HST, but was unable to tolerate nose piece of diagnostic testing.  He was then RX'ed [facility] instead. \par Patient has not yet had Lung CA Screening Chest CT. \par \par \par He denies fever/chills, decreased appetite, SOB @ rest, wheezing, chest tightness or any other issues at this time. \par \par  [TextBox_11] : .75 [TextBox_13] : 45 [YearQuit] : 2021

## 2022-02-04 NOTE — PHYSICAL EXAM
[No Acute Distress] : no acute distress [Well Nourished] : well nourished [No Deformities] : no deformities [Well Developed] : well developed [Normal Appearance] : normal appearance [No Resp Distress] : no resp distress [Normal Color/ Pigmentation] : normal color/ pigmentation [Oriented x3] : oriented x3 [Normal Mood] : normal mood [Normal Affect] : normal affect

## 2022-02-04 NOTE — ASSESSMENT
[FreeTextEntry1] : Mr. Holley is a 66 year old, former 34 pack year smoking, male.  He has past medical history of hernia, colon resection, cervical disc surgery, leg vessal angioplasty x 5 (on Xarelto), rib fractures s/p fall, HTN, HLD, GERD, and fatigue. He presents for a follow up visit, via video. His chief concern is discussing recent HST results.\par \par 1. COPD:\par - Based on recent PFTs.\par - CXR in office WNL; Lung CA Screening Chest CT RX'ed.\par - Add Albuterol 2 puffs Q4-6H PRN and pre-exertion.\par - Add Breztri 2 puffs BID (rinse and gargle post use - 14 day sample provided). \par \par 2. Former Smoker:\par - Chest CT (Lung CA Screening) for further eval.\par \par 3. GERD:\par - Continue Dexilant & Omeprazole as RX'ed by GI.\par \par 4. ROLLY: \par - I have discussed all the negative health consequences associated with obstructive and central sleep apnea including heart conditions/MI, hypertension, diabetes, chronic inflammation, memory issues, stroke, obesity, decreased libido, sleep related accidents, as well as anxiety and depression. Additional recommendations included: Avoid alcohol and sedatives at bedtime. Proper sleep hygiene such as maintaining a regular sleep routine, avoiding naps if possible, not watching TV or reading in bed,  and maintaining a quiet, comfortable bedroom. Sleepy driving avoidance and risks discussed with patient. Diet, exercise and weight loss suggested\par - RX for Dental supplies, HST, and list of DDS to be mailed out to patient. \par - Patient states if dental device is too expensive, he will then consider PAP. \par \par Patient to follow up after DDS consultation. \par Patient to call with further questions and concerns.\par Patient verbalizes understanding of care plan and is agreeable.\par \par

## 2022-03-09 ENCOUNTER — APPOINTMENT (OUTPATIENT)
Dept: PULMONOLOGY | Facility: CLINIC | Age: 67
End: 2022-03-09

## 2022-04-13 ENCOUNTER — APPOINTMENT (OUTPATIENT)
Dept: NUCLEAR MEDICINE | Facility: CLINIC | Age: 67
End: 2022-04-13

## 2022-05-19 ENCOUNTER — NON-APPOINTMENT (OUTPATIENT)
Age: 67
End: 2022-05-19

## 2022-06-06 NOTE — PATIENT PROFILE ADULT. - PRESSURE ULCER(S)
Lab says: [id number] test cod, Malignant hyperthermia panel.     It is not a common test so should be ordered as MISC test.     If ok, please review/sign.      no

## 2022-10-03 ENCOUNTER — NON-APPOINTMENT (OUTPATIENT)
Age: 67
End: 2022-10-03

## 2022-12-31 ENCOUNTER — NON-APPOINTMENT (OUTPATIENT)
Age: 67
End: 2022-12-31

## 2023-01-08 ENCOUNTER — RX RENEWAL (OUTPATIENT)
Age: 68
End: 2023-01-08

## 2023-01-23 ENCOUNTER — NON-APPOINTMENT (OUTPATIENT)
Age: 68
End: 2023-01-23

## 2023-01-25 ENCOUNTER — NON-APPOINTMENT (OUTPATIENT)
Age: 68
End: 2023-01-25

## 2023-02-01 NOTE — PATIENT PROFILE ADULT. - PRO ARRIVE FROM
Goal Outcome Evaluation:  Pt transferred from CCU and has been resting since. VSS, will continue to monitor.  Call light is within reach.     Matt Mora, RNA               home

## 2023-04-17 ENCOUNTER — OUTPATIENT (OUTPATIENT)
Dept: OUTPATIENT SERVICES | Facility: HOSPITAL | Age: 68
LOS: 1 days | End: 2023-04-17
Payer: MEDICARE

## 2023-04-17 ENCOUNTER — APPOINTMENT (OUTPATIENT)
Dept: CT IMAGING | Facility: CLINIC | Age: 68
End: 2023-04-17
Payer: MEDICARE

## 2023-04-17 DIAGNOSIS — D64.9 ANEMIA, UNSPECIFIED: ICD-10-CM

## 2023-04-17 DIAGNOSIS — Z98.890 OTHER SPECIFIED POSTPROCEDURAL STATES: Chronic | ICD-10-CM

## 2023-04-17 DIAGNOSIS — Z90.49 ACQUIRED ABSENCE OF OTHER SPECIFIED PARTS OF DIGESTIVE TRACT: Chronic | ICD-10-CM

## 2023-04-17 DIAGNOSIS — I73.9 PERIPHERAL VASCULAR DISEASE, UNSPECIFIED: Chronic | ICD-10-CM

## 2023-04-17 PROCEDURE — 74177 CT ABD & PELVIS W/CONTRAST: CPT | Mod: 26,MH

## 2023-04-17 PROCEDURE — 74177 CT ABD & PELVIS W/CONTRAST: CPT | Mod: MH

## 2024-03-25 NOTE — BRIEF OPERATIVE NOTE - PROCEDURE
[facility] [address] [address] [phone]  Dept: [phone]  ______________________________________________________________________________    Patient: Vilma Mendoza   : 1968   MRN: [id number]   2024    INVASIVE PROCEDURE SAFETY CHECKLIST    Date: 3/25/2024   Procedure:Right shoulder USG soft tissue mass biopsy  Patient Name: Vilma Mendoza  MRN: [id number]  YOB: 1968    Action: Complete sections as appropriate. Any discrepancy results in a HARD COPY until resolved.     PRE PROCEDURE:  Patient ID verified with 2 identifiers (name and  or MRN): Yes  Procedure and site verified with patient/designee (when able): Yes  Accurate consent documentation in medical record: Yes  H&P (or appropriate assessment) documented in medical record: NA  H&P must be up to 20 days prior to procedure and updates within 24 hours of procedure as applicable: NA  Relevant diagnostic and radiology test results appropriately labeled and displayed as applicable: Yes  Procedure site(s) marked with provider initials: NA    TIMEOUT:  Time-Out performed immediately prior to starting procedure, including verbal and active participation of all team members addressing the following:Yes  * Correct patient identify  * Confirmed that the correct side and site are marked  * An accurate procedure consent form  * Agreement on the procedure to be done  * Correct patient position  * Relevant images and results are properly labeled and appropriately displayed  * The need to administer antibiotics or fluids for irrigation purposes during the procedure as applicable   * Safety precautions based on patient history or medication use    DURING PROCEDURE: Verification of correct person, site, and procedures any time the responsibility for care of the patient is transferred to another member of the care team.       The following medications were  given:         Prior to injection, verified patient identity using patient's name and date of birth.  Due to injection administration, patient instructed to remain in clinic for 15 minutes  afterwards, and to report any adverse reaction to me immediately.      Medication Name: Lidocaine NDC 51155-194-98  Drug Amount Wasted:  Yes: 10 mg/ml   Vial/Syringe: Single dose vial  Expiration Date:  02/27        Scribed by Kalani Arrieta ATC for Dr. Alberto Barreto PA-C on March 25, 2024 at 11:34a based on the provider's statements to me.     Kalani Arrieta ATC     <<-----Click on this checkbox to enter Procedure Incisional hernia repair with mesh  05/11/2018    Active  [id number]

## 2024-04-08 ENCOUNTER — APPOINTMENT (OUTPATIENT)
Dept: INTERNAL MEDICINE | Facility: CLINIC | Age: 69
End: 2024-04-08
Payer: MEDICARE

## 2024-04-08 VITALS
HEIGHT: 74 IN | OXYGEN SATURATION: 98 % | DIASTOLIC BLOOD PRESSURE: 62 MMHG | RESPIRATION RATE: 16 BRPM | WEIGHT: 208.31 LBS | SYSTOLIC BLOOD PRESSURE: 114 MMHG | BODY MASS INDEX: 26.73 KG/M2 | TEMPERATURE: 97.7 F | HEART RATE: 86 BPM

## 2024-04-08 DIAGNOSIS — K21.9 GASTRO-ESOPHAGEAL REFLUX DISEASE W/OUT ESOPHAGITIS: ICD-10-CM

## 2024-04-08 DIAGNOSIS — M54.9 DORSALGIA, UNSPECIFIED: ICD-10-CM

## 2024-04-08 DIAGNOSIS — Z00.00 ENCOUNTER FOR GENERAL ADULT MEDICAL EXAMINATION W/OUT ABNORMAL FINDINGS: ICD-10-CM

## 2024-04-08 DIAGNOSIS — Z86.39 PERSONAL HISTORY OF OTHER ENDOCRINE, NUTRITIONAL AND METABOLIC DISEASE: ICD-10-CM

## 2024-04-08 DIAGNOSIS — G62.9 POLYNEUROPATHY, UNSPECIFIED: ICD-10-CM

## 2024-04-08 DIAGNOSIS — E11.9 TYPE 2 DIABETES MELLITUS W/OUT COMPLICATIONS: ICD-10-CM

## 2024-04-08 DIAGNOSIS — Z13.31 ENCOUNTER FOR SCREENING FOR DEPRESSION: ICD-10-CM

## 2024-04-08 DIAGNOSIS — F17.200 NICOTINE DEPENDENCE, UNSPECIFIED, UNCOMPLICATED: ICD-10-CM

## 2024-04-08 PROCEDURE — 99203 OFFICE O/P NEW LOW 30 MIN: CPT | Mod: 25

## 2024-04-08 PROCEDURE — G0438: CPT

## 2024-04-08 RX ORDER — GABAPENTIN 100 MG/1
100 CAPSULE ORAL
Qty: 90 | Refills: 1 | Status: ACTIVE | COMMUNITY
Start: 2024-04-08 | End: 1900-01-01

## 2024-04-08 RX ORDER — DOCUSATE SODIUM 100 MG/1
100 CAPSULE, LIQUID FILLED ORAL 3 TIMES DAILY
Refills: 0 | Status: ACTIVE | COMMUNITY

## 2024-04-08 RX ORDER — OXYCODONE HYDROCHLORIDE 5 MG/1
5 CAPSULE ORAL
Qty: 270 | Refills: 0 | Status: ACTIVE | COMMUNITY
Start: 2024-04-08

## 2024-04-08 RX ORDER — TRAZODONE HYDROCHLORIDE 50 MG/1
50 TABLET ORAL
Qty: 30 | Refills: 1 | Status: ACTIVE | COMMUNITY

## 2024-04-08 RX ORDER — SERTRALINE HYDROCHLORIDE 100 MG/1
100 TABLET, FILM COATED ORAL DAILY
Refills: 0 | Status: ACTIVE | COMMUNITY

## 2024-04-08 RX ORDER — SODIUM SULFATE, POTASSIUM SULFATE, MAGNESIUM SULFATE 17.5; 3.13; 1.6 G/ML; G/ML; G/ML
17.5-3.13-1.6 SOLUTION, CONCENTRATE ORAL
Refills: 0 | Status: ACTIVE | COMMUNITY

## 2024-04-08 RX ORDER — ALBUTEROL SULFATE 90 UG/1
108 (90 BASE) INHALANT RESPIRATORY (INHALATION)
Qty: 1 | Refills: 1 | Status: DISCONTINUED | COMMUNITY
Start: 2021-12-01 | End: 2024-04-08

## 2024-04-08 RX ORDER — OMEPRAZOLE 40 MG/1
40 CAPSULE, DELAYED RELEASE ORAL
Qty: 30 | Refills: 0 | Status: DISCONTINUED | COMMUNITY
Start: 2017-11-16 | End: 2024-04-08

## 2024-04-08 NOTE — HEALTH RISK ASSESSMENT
[Yes] : Yes [Monthly or less (1 pt)] : Monthly or less (1 point) [1 or 2 (0 pts)] : 1 or 2 (0 points) [Never (0 pts)] : Never (0 points) [No] : In the past 12 months have you used drugs other than those required for medical reasons? No [0] : 1) Little interest or pleasure doing things: Not at all (0) [1] : 2) Feeling down, depressed, or hopeless for several days (1) [PHQ-2 Negative - No further assessment needed] : PHQ-2 Negative - No further assessment needed [Never] : Never [Audit-CScore] : 1 [[id number]] : 1

## 2024-04-08 NOTE — PLAN
[FreeTextEntry1] : Pulmonary COPD - continue Breztri Aerosphere 160-9-4.8 MCG/ACT inhaler x2 puffs BID as directed  former smoker - check EKG, recently done at cardiologist's office, scanned to chart - continue smoking cessation  Cardiology hyperlipidemia - continue Ezetimibe 10mg p.o.q.d. as directed; continue Rosuvastatin Calcium 20mg p.o.q.d. as directed; continue low cholesterol/low fat diet - check FLP/LFTs at next visit chronic heart failure - self-discontinued Entresto, f/u Dr. Read   peripheral arterial disease - continue Clopidogrel Bisulfate 75mg p.o.q.d. as directed; continue Xarelto as directed  hypertension - continue Metoprolol Tartrate 12.5mg p.o.q.d. as directed; advised to follow a low sodium diet  Continue to follow up with cardiologist, Dr. Son Read  Neurology neuropathy - start Gabapentin 100 mg AM and 200 mg pm Vascular h/o diabetes and peripheral arterial disease - referred to vascular physician Dr. Lia Johns for further evaluation (2nd opinion) Psychiatry insomnia - continue Alprazolam p.o.p.r.n. as directed; continue Trazadone HCl 50mg p.o.p.r.n. as directed  anxiety/depression - continue Sertraline HCl as directed  Endocrinology weight gain - off Ozempic secondary to GI side effects - recommended to follow a  low carbohydrate diet and increase CV exercise  DM 2 - continue Metformin ER as directed; recommended low carbohydrate diet and increase CV exercise  Gastroenterology chronic constipation - continue Docusate Sodium 100mg p.o. TID as directed; continue Colace as directed; continue Trulance 3mg p.o.p.r.n.  GERD - continue Dexilant 60mg p.o. as directed - continue anti-reflux measures  Continue to follow up with gastroenterologist, Dr. Stone  Musculoskeletal  back pain - continue Oxycodone HCl 5mg p.o.p.r.n. as directed - continue to follow up with pain management physician Dr. Rios and neurosurgeon Dr. Devon Singleton   Requested records from Dr. Read (cardiology), Dr. Rosario (Vascular sx) and Dr. Singleton (spine surgerY) Follow up for fasting blood work and further management

## 2024-04-08 NOTE — ADDENDUM
[FreeTextEntry1] : I, Marisaangella Murphymushtaq, acted solely as scribe for Dr. Peter Wolfe DO on this date 4/8/2024.  All medical record entries made by the Scribe were at my, Dr. Peter Wolfe DO direction and personally dictated by me on 4/8/2024. I have reviewed the chart and agree that the record accurately reflects my personal performance of the history, physical exam, assessment and plan. I have also personally directed, reviewed and agreed with the chart.

## 2024-04-08 NOTE — PHYSICAL EXAM
[No Acute Distress] : no acute distress [Well Nourished] : well nourished [Well Developed] : well developed [Well-Appearing] : well-appearing [Normal Sclera/Conjunctiva] : normal sclera/conjunctiva [PERRL] : pupils equal round and reactive to light [EOMI] : extraocular movements intact [Normal Outer Ear/Nose] : the outer ears and nose were normal in appearance [Normal Oropharynx] : the oropharynx was normal [No JVD] : no jugular venous distention [No Lymphadenopathy] : no lymphadenopathy [Supple] : supple [Thyroid Normal, No Nodules] : the thyroid was normal and there were no nodules present [No Respiratory Distress] : no respiratory distress  [No Accessory Muscle Use] : no accessory muscle use [Clear to Auscultation] : lungs were clear to auscultation bilaterally [Normal Rate] : normal rate  [Regular Rhythm] : with a regular rhythm [Normal S1, S2] : normal S1 and S2 [No Murmur] : no murmur heard [No Carotid Bruits] : no carotid bruits [No Abdominal Bruit] : a ~M bruit was not heard ~T in the abdomen [No Varicosities] : no varicosities [Pedal Pulses Present] : the pedal pulses are present [No Edema] : there was no peripheral edema [No Palpable Aorta] : no palpable aorta [No Extremity Clubbing/Cyanosis] : no extremity clubbing/cyanosis [Soft] : abdomen soft [Non Tender] : non-tender [Non-distended] : non-distended [No Masses] : no abdominal mass palpated [No HSM] : no HSM [Normal Bowel Sounds] : normal bowel sounds [Normal Posterior Cervical Nodes] : no posterior cervical lymphadenopathy [Normal Anterior Cervical Nodes] : no anterior cervical lymphadenopathy [No CVA Tenderness] : no CVA  tenderness [No Spinal Tenderness] : no spinal tenderness [No Joint Swelling] : no joint swelling [Grossly Normal Strength/Tone] : grossly normal strength/tone [No Rash] : no rash [Coordination Grossly Intact] : coordination grossly intact [No Focal Deficits] : no focal deficits [Normal Gait] : normal gait [Deep Tendon Reflexes (DTR)] : deep tendon reflexes were 2+ and symmetric [Normal Affect] : the affect was normal [Normal Insight/Judgement] : insight and judgment were intact [Normal] : affect was normal and insight and judgment were intact [Normal Voice/Communication] : normal voice/communication [Normal TMs] : both tympanic membranes were normal [Normal Nasal Mucosa] : the nasal mucosa was normal [Normal Supraclavicular Nodes] : no supraclavicular lymphadenopathy [Kyphosis] : no kyphosis [Scoliosis] : no scoliosis [Acne] : no acne [Speech Grossly Normal] : speech grossly normal [Memory Grossly Normal] : memory grossly normal [Alert and Oriented x3] : oriented to person, place, and time [Normal Mood] : the mood was normal [de-identified] : overweight

## 2024-04-08 NOTE — HISTORY OF PRESENT ILLNESS
[Spouse] : spouse [FreeTextEntry1] : Annual wellness visit - new patient  [de-identified] : Patient is a 68-year-old male with a past medical history as below who presents for an annual wellness visit. Patient is currently taking prescription medications and noted below and denies any adverse reactions or side effects. Patient denies any allergies to medications. He denies any diffuse myalgias/arthralgias on Rosuvastatin Calcium. He denies any lightheadedness/dizziness on Metoprolol Succinate. GERD is well managed on Omeprazole. Patient has self-discontinued Entresto use as he attributes feeling worse with this medication use. Significant past medical history includes COPD, GERD, IBS, MSSA, ROLLY, sciatica, lumbar back pain, peripheral arterial disease s/p stent placement, hyperlipidemia, chronic constipation, Enriquez's esophagus, diverticulitis, and type 2 diabetes. Significant past surgical history includes appendectomy, incisional hernia repair, endo fundoplication, and partial colectomy (sigmoid). Patient's last endoscopy/colonoscopy screening was done in 2023 with gastroenterologist, Dr. Stone.   Patient is a former smoker and does not drink alcohol. He had smoked for a duration of 40 years and quit 2 years ago. He is  with two children and five grandchildren.    Patient was on Ozempic for a duration of a month and a half but had discontinued the medication secondary to worsening constipation. Of note, patient has chronic constipation which is being managed by medications.   Patient reports lumbar back pain and lower extremity pain associated with neuropathy. He has been following up with neurosurgeon Dr. Devon Singleton at [facility] and is planning surgical treatment for his back at this time. He also has been following up with pain management physician Dr. Rios. He states that the neuropathy has been worsening over time and has caused him difficulty when walking; previously on Gabapentin with little to no improvement in symptoms.    Significant Family Hx: Mother - heart problem Father - heart problem  Grandfather - diabetes, colon cancer   Patient has not fasted today.

## 2024-04-08 NOTE — ASSESSMENT
[FreeTextEntry1] : Patient is a 68-year-old male with a past medical history as above who presents for an annual wellness visit.

## 2024-04-17 ENCOUNTER — APPOINTMENT (OUTPATIENT)
Dept: VASCULAR SURGERY | Facility: CLINIC | Age: 69
End: 2024-04-17
Payer: MEDICARE

## 2024-04-17 VITALS
HEIGHT: 74 IN | BODY MASS INDEX: 25.8 KG/M2 | RESPIRATION RATE: 16 BRPM | HEART RATE: 95 BPM | WEIGHT: 201 LBS | DIASTOLIC BLOOD PRESSURE: 72 MMHG | OXYGEN SATURATION: 98 % | SYSTOLIC BLOOD PRESSURE: 102 MMHG

## 2024-04-17 DIAGNOSIS — I73.9 PERIPHERAL VASCULAR DISEASE, UNSPECIFIED: ICD-10-CM

## 2024-04-17 PROCEDURE — 99213 OFFICE O/P EST LOW 20 MIN: CPT

## 2024-04-17 PROCEDURE — 93923 UPR/LXTR ART STDY 3+ LVLS: CPT

## 2024-04-18 NOTE — HISTORY OF PRESENT ILLNESS
[FreeTextEntry1] : Mr. CARLOS CUEVAS is a 68-year-old M who presents for initial evaluation of known PAD but would like to establish a new vascular surgeon.  Pt has right femoral to popliteal bypass, which is occluded, and post op complicated by a graft infection treated with 9 months of IV antibiotics.  He also has left SFA and proximal popliteal artery for a long segment occlusion and peroneal stents.  Peroneal stent is occluded. He is complaining of bilateral leg discomfort, right greater than left. Mr. CUEVAS leg pain is at rest and occurs after walking only short distances.  Mr. CUEVAS has been experiencing number in his toes and he does have known chronic lumbar pathology that requires an intervention.  He follows up with neurosurgeon, Dr. Devon Singleton, at [facility] and is planning surgical treatment for his back at this time. Pt is a past smoker with 40 pack year history.

## 2024-04-18 NOTE — PLAN
[TextEntry] : CT ango abd aorta to eval right femoral-popliteal bypass and left SFA stent Follow up appointment in 2-3 weeks I have spent a total of 20 minutes with the patient and coordinating care.

## 2024-04-18 NOTE — PHYSICAL EXAM
[1+] : left 1+ [0] : left 0 [Ankle Swelling (On Exam)] : not present [Varicose Veins Of Lower Extremities] : not present [] : not present [FreeTextEntry1] : No Varicosities bilateral  no Edema bilateral  no Skin changes No Ulcer Dopperable DP pulses bilaterally

## 2024-04-18 NOTE — ASSESSMENT
[FreeTextEntry1] : Mr. CARLOS CUEVAS is a 68 year old with known arterial disease.  Occluded right fem-pop bypass, right SFA stent, peroneal stent.

## 2024-04-18 NOTE — DATA REVIEWED
[FreeTextEntry1] : 02/28/24 Right 0.67, Left 0.79, bilateral waveforms suggest presence of occlusive disease int he fem-pop arteries.  4/17/2024: JAZZ/PVR right 0.79, left 0.77, decreased waveforms in thigh and calf suggesting disease/occlusion.

## 2024-06-05 ENCOUNTER — APPOINTMENT (OUTPATIENT)
Dept: VASCULAR SURGERY | Facility: CLINIC | Age: 69
End: 2024-06-05

## 2024-06-07 ENCOUNTER — APPOINTMENT (OUTPATIENT)
Dept: CT IMAGING | Facility: CLINIC | Age: 69
End: 2024-06-07
Payer: MEDICARE

## 2024-06-07 ENCOUNTER — OUTPATIENT (OUTPATIENT)
Dept: OUTPATIENT SERVICES | Facility: HOSPITAL | Age: 69
LOS: 1 days | End: 2024-06-07
Payer: MEDICARE

## 2024-06-07 DIAGNOSIS — I73.9 PERIPHERAL VASCULAR DISEASE, UNSPECIFIED: Chronic | ICD-10-CM

## 2024-06-07 DIAGNOSIS — Z98.890 OTHER SPECIFIED POSTPROCEDURAL STATES: Chronic | ICD-10-CM

## 2024-06-07 DIAGNOSIS — Z90.49 ACQUIRED ABSENCE OF OTHER SPECIFIED PARTS OF DIGESTIVE TRACT: Chronic | ICD-10-CM

## 2024-06-07 DIAGNOSIS — I73.9 PERIPHERAL VASCULAR DISEASE, UNSPECIFIED: ICD-10-CM

## 2024-06-07 PROCEDURE — 75635 CT ANGIO ABDOMINAL ARTERIES: CPT | Mod: 26,MH

## 2024-06-07 PROCEDURE — 75635 CT ANGIO ABDOMINAL ARTERIES: CPT

## 2024-07-10 ENCOUNTER — APPOINTMENT (OUTPATIENT)
Dept: VASCULAR SURGERY | Facility: CLINIC | Age: 69
End: 2024-07-10
Payer: MEDICARE

## 2024-07-10 DIAGNOSIS — M48.062 SPINAL STENOSIS, LUMBAR REGION WITH NEUROGENIC CLAUDICATION: ICD-10-CM

## 2024-07-10 DIAGNOSIS — E11.9 TYPE 2 DIABETES MELLITUS W/OUT COMPLICATIONS: ICD-10-CM

## 2024-07-10 DIAGNOSIS — I73.9 PERIPHERAL VASCULAR DISEASE, UNSPECIFIED: ICD-10-CM

## 2024-07-10 PROCEDURE — 99204 OFFICE O/P NEW MOD 45 MIN: CPT

## 2024-09-11 NOTE — H&P PST ADULT - NSANTHTIREDRD_ENT_A_CORE
Advance Care Planning   Healthcare Decision Maker:    Primary Decision Maker: DonovanAna LiliaHeather - [facility] - [phone]    Click here to complete Healthcare Decision Makers including selection of the Healthcare Decision Maker Relationship (ie \"Primary\").              No

## 2024-09-12 ENCOUNTER — APPOINTMENT (OUTPATIENT)
Dept: ENDOCRINOLOGY | Facility: CLINIC | Age: 69
End: 2024-09-12
Payer: MEDICARE

## 2024-09-12 VITALS
DIASTOLIC BLOOD PRESSURE: 78 MMHG | SYSTOLIC BLOOD PRESSURE: 132 MMHG | WEIGHT: 205 LBS | HEIGHT: 74 IN | HEART RATE: 96 BPM | OXYGEN SATURATION: 98 % | BODY MASS INDEX: 26.31 KG/M2

## 2024-09-12 DIAGNOSIS — E11.9 TYPE 2 DIABETES MELLITUS W/OUT COMPLICATIONS: ICD-10-CM

## 2024-09-12 PROCEDURE — 99205 OFFICE O/P NEW HI 60 MIN: CPT

## 2024-09-12 PROCEDURE — 83036 HEMOGLOBIN GLYCOSYLATED A1C: CPT | Mod: QW

## 2024-09-12 PROCEDURE — 82962 GLUCOSE BLOOD TEST: CPT

## 2024-09-12 PROCEDURE — G2211 COMPLEX E/M VISIT ADD ON: CPT

## 2024-09-12 RX ORDER — METFORMIN HYDROCHLORIDE 500 MG/1
500 TABLET, COATED ORAL
Refills: 0 | Status: ACTIVE | COMMUNITY

## 2024-09-12 RX ORDER — SEMAGLUTIDE 0.68 MG/ML
2 INJECTION, SOLUTION SUBCUTANEOUS
Qty: 3 | Refills: 1 | Status: ACTIVE | COMMUNITY
Start: 2024-09-12 | End: 1900-01-01

## 2024-09-12 RX ORDER — DULOXETINE HYDROCHLORIDE 30 MG/1
CAPSULE, DELAYED RELEASE ORAL
Refills: 0 | Status: ACTIVE | COMMUNITY

## 2024-09-15 RX ORDER — BLOOD-GLUCOSE METER
W/DEVICE EACH MISCELLANEOUS
Qty: 1 | Refills: 0 | Status: ACTIVE | COMMUNITY
Start: 2024-09-15 | End: 1900-01-01

## 2024-09-15 RX ORDER — BLOOD SUGAR DIAGNOSTIC
STRIP MISCELLANEOUS
Qty: 1 | Refills: 1 | Status: ACTIVE | COMMUNITY
Start: 2024-09-15 | End: 1900-01-01

## 2024-09-15 RX ORDER — LANCETS 33 GAUGE
EACH MISCELLANEOUS
Qty: 1 | Refills: 2 | Status: ACTIVE | COMMUNITY
Start: 2024-09-15 | End: 1900-01-01

## 2024-09-15 NOTE — HISTORY OF PRESENT ILLNESS
[FreeTextEntry1] : Mr. 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 Patient denies any history of diabetic retinopathy, nephropathy or neuropathy. He denies any blurry vision, polyuria, polydipsia and numbness/tingling in the extremities.   POCT Glucose: mg/dL POCT Hga1c: %   Diabetes first diagnosed:many years Type:2   Current diabetic regimen: Metformin  500 mg BID Ozempic 0.25 mg Qweekly Other relevant medications:  Self monitoring blood glucose : Glucometer: Does not monitor  SMBG: Pre-breakfast: Pre-lunch: Pre-dinner: bedtime:  Hypoglycemia:   Diet: Breakfast:bread Lunch: pasta, hamburger, chicken, fish Dinner:pasta, hamburger, chicken, fish Snacks:  Exercise:  Urine micro: lipid profile: last hba1c:7.1% (9/2024) eye exam:2024 diabetic foot exam/podiatry:

## 2024-09-15 NOTE — ASSESSMENT
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 DM 2- uncontrolled, uncomplicated Patient counseled on the importance of diabetic control and risk of complications. We discussed about microvascular disease and macrovascular disease. We discussed the importance of ophthalmology evaluations annually or more frequent as necessary. We also discussed the importance of diabetes foot care. Some form of glucose monitoring is always advised. Maintaining a low carbohydrate/ADA diet and physical activity was discussed. Patient's diet and the necessary changes discussed. Reviewed over glycemic goals.   Cont Metformin  500 mg BID Inc to Ozempic 0.50 mg Qweekly Check fsg QD Cont ADA diet Cont exercise Will refer to CDE/RD Will retrieve labs from PCP Opthalmology Visit up to date Foot Exam up to date   2. HLD- stable Cont ROsuvastatin 40 mg QD COnt zetia

## 2024-09-15 NOTE — PHYSICAL EXAM
[Alert] : alert [Well Nourished] : well nourished [No Acute Distress] : no acute distress [Well Developed] : well developed [Normal Sclera/Conjunctiva] : normal sclera/conjunctiva [EOMI] : extra ocular movement intact [No Proptosis] : no proptosis [Normal Oropharynx] : the oropharynx was normal [Thyroid Not Enlarged] : the thyroid was not enlarged [No Thyroid Nodules] : no palpable thyroid nodules [No Respiratory Distress] : no respiratory distress [No Accessory Muscle Use] : no accessory muscle use [Clear to Auscultation] : lungs were clear to auscultation bilaterally [Normal S1, S2] : normal S1 and S2 [Normal Rate] : heart rate was normal [Regular Rhythm] : with a regular rhythm [No Edema] : no peripheral edema [Pedal Pulses Normal] : the pedal pulses are present [Normal Bowel Sounds] : normal bowel sounds [Not Tender] : non-tender [Not Distended] : not distended [Soft] : abdomen soft [Normal Anterior Cervical Nodes] : no anterior cervical lymphadenopathy [Normal Posterior Cervical Nodes] : no posterior cervical lymphadenopathy [No Spinal Tenderness] : no spinal tenderness [Spine Straight] : spine straight [No Stigmata of Cushings Syndrome] : no stigmata of Cushings Syndrome [Normal Gait] : normal gait [Normal Strength/Tone] : muscle strength and tone were normal [No Rash] : no rash [Right foot was examined, including] : right foot ~C was examined, including visual inspection with sensory and pulse exams [Left foot was examined, including] : left foot ~C was examined, including visual inspection with sensory and pulse exams [Normal] : normal [Full ROM] : with full range of motion [1+] : 1+ in the dorsalis pedis [#1 Diminished] : number 1 was diminished [Normal Reflexes] : deep tendon reflexes were 2+ and symmetric [No Tremors] : no tremors [Oriented x3] : oriented to person, place, and time [Acanthosis Nigricans] : no acanthosis nigricans [Diminished Throughout Both Feet] : normal tactile sensation with monofilament testing throughout both feet

## 2024-09-30 ENCOUNTER — APPOINTMENT (OUTPATIENT)
Dept: ENDOCRINOLOGY | Facility: CLINIC | Age: 69
End: 2024-09-30

## 2024-11-24 ENCOUNTER — NON-APPOINTMENT (OUTPATIENT)
Age: 69
End: 2024-11-24

## 2024-12-18 ENCOUNTER — APPOINTMENT (OUTPATIENT)
Dept: ENDOCRINOLOGY | Facility: CLINIC | Age: 69
End: 2024-12-18

## 2025-01-22 ENCOUNTER — APPOINTMENT (OUTPATIENT)
Dept: ENDOCRINOLOGY | Facility: CLINIC | Age: 70
End: 2025-01-22
Payer: MEDICARE

## 2025-01-22 VITALS
WEIGHT: 208 LBS | DIASTOLIC BLOOD PRESSURE: 88 MMHG | HEART RATE: 91 BPM | OXYGEN SATURATION: 99 % | HEIGHT: 74 IN | SYSTOLIC BLOOD PRESSURE: 130 MMHG | BODY MASS INDEX: 26.69 KG/M2

## 2025-01-22 DIAGNOSIS — E11.9 TYPE 2 DIABETES MELLITUS W/OUT COMPLICATIONS: ICD-10-CM

## 2025-01-22 PROCEDURE — 83036 HEMOGLOBIN GLYCOSYLATED A1C: CPT | Mod: QW

## 2025-01-22 PROCEDURE — 99214 OFFICE O/P EST MOD 30 MIN: CPT

## 2025-01-22 RX ORDER — EMPAGLIFLOZIN 10 MG/1
10 TABLET, FILM COATED ORAL
Qty: 90 | Refills: 2 | Status: ACTIVE | COMMUNITY
Start: 2025-01-22 | End: 1900-01-01

## 2025-05-28 ENCOUNTER — APPOINTMENT (OUTPATIENT)
Dept: ENDOCRINOLOGY | Facility: CLINIC | Age: 70
End: 2025-05-28